# Patient Record
Sex: FEMALE | Race: WHITE | NOT HISPANIC OR LATINO | Employment: OTHER | URBAN - METROPOLITAN AREA
[De-identification: names, ages, dates, MRNs, and addresses within clinical notes are randomized per-mention and may not be internally consistent; named-entity substitution may affect disease eponyms.]

---

## 2017-01-19 ENCOUNTER — HOSPITAL ENCOUNTER (OUTPATIENT)
Dept: GENERAL RADIOLOGY | Facility: HOSPITAL | Age: 62
Discharge: HOME | End: 2017-01-19
Attending: PODIATRIST | Admitting: PODIATRIST

## 2017-01-19 DIAGNOSIS — M20.12 HALLUX VALGUS, LEFT: Primary | ICD-10-CM

## 2017-01-19 PROCEDURE — 73630 X-RAY EXAM OF FOOT: CPT

## 2017-03-13 ENCOUNTER — OFFICE (OUTPATIENT)
Dept: URBAN - METROPOLITAN AREA CLINIC 71 | Facility: CLINIC | Age: 62
End: 2017-03-13

## 2017-03-13 VITALS — DIASTOLIC BLOOD PRESSURE: 90 MMHG | HEART RATE: 64 BPM | SYSTOLIC BLOOD PRESSURE: 138 MMHG | HEIGHT: 64 IN

## 2017-03-13 DIAGNOSIS — Z12.11 ENCOUNTER FOR SCREENING FOR MALIGNANT NEOPLASM OF COLON: ICD-10-CM

## 2017-03-13 DIAGNOSIS — K21.0 GASTRO-ESOPHAGEAL REFLUX DISEASE WITH ESOPHAGITIS: ICD-10-CM

## 2017-03-13 PROCEDURE — 99212 OFFICE O/P EST SF 10 MIN: CPT

## 2017-03-27 RX ORDER — BUDESONIDE AND FORMOTEROL FUMARATE DIHYDRATE 160; 4.5 UG/1; UG/1
AEROSOL RESPIRATORY (INHALATION)
Qty: 30.6 G | Refills: 2 | Status: SHIPPED | OUTPATIENT
Start: 2017-03-27 | End: 2019-07-12

## 2017-05-30 RX ORDER — LISINOPRIL 5 MG/1
TABLET ORAL
Qty: 90 TABLET | Refills: 0 | Status: SHIPPED | OUTPATIENT
Start: 2017-05-30 | End: 2017-08-28 | Stop reason: SDUPTHER

## 2017-06-05 ENCOUNTER — TRANSCRIBE ORDERS (OUTPATIENT)
Dept: ADMINISTRATIVE | Facility: HOSPITAL | Age: 62
End: 2017-06-05

## 2017-06-06 ENCOUNTER — TELEPHONE (OUTPATIENT)
Dept: INTERNAL MEDICINE | Facility: CLINIC | Age: 62
End: 2017-06-06

## 2017-06-06 DIAGNOSIS — N64.89 BREAST ASYMMETRY IN FEMALE: ICD-10-CM

## 2017-06-06 DIAGNOSIS — N64.4 BREAST PAIN, LEFT: Primary | ICD-10-CM

## 2017-06-06 NOTE — TELEPHONE ENCOUNTER
I put in order for DX mammo with ultrasound, if needed.  Patient advised.    ----- Message from Amber Duncan MA sent at 6/5/2017  2:59 PM EDT -----      ----- Message -----     From: Kae Méndez     Sent: 6/5/2017   1:10 PM       [facility] [facility]    PT IS CALLING TO SAY SHE NEEDS AN ORDER PUT IN FOR A MAMMOGRAM. STATES SHE CALLED [facility] TO SCHEDULE IT BUT EXPLAINED TO THEM THAT HER LEFT BREAST IS QUITE A BIT BIGGER THAN HER RIGHT AND WHEN SHE PUSHES AGAINST IT, ITS PAINFUL. PT SEES [facility]. CALL BACK [phone].

## 2017-06-12 ENCOUNTER — APPOINTMENT (OUTPATIENT)
Dept: MAMMOGRAPHY | Facility: HOSPITAL | Age: 62
End: 2017-06-12
Attending: FAMILY MEDICINE

## 2017-06-12 ENCOUNTER — APPOINTMENT (OUTPATIENT)
Dept: ULTRASOUND IMAGING | Facility: HOSPITAL | Age: 62
End: 2017-06-12
Attending: FAMILY MEDICINE

## 2017-06-16 ENCOUNTER — HOSPITAL ENCOUNTER (OUTPATIENT)
Dept: MAMMOGRAPHY | Facility: HOSPITAL | Age: 62
Discharge: HOME | End: 2017-06-16
Attending: FAMILY MEDICINE | Admitting: FAMILY MEDICINE

## 2017-06-16 ENCOUNTER — HOSPITAL ENCOUNTER (OUTPATIENT)
Dept: ULTRASOUND IMAGING | Facility: HOSPITAL | Age: 62
Discharge: HOME | End: 2017-06-16
Attending: FAMILY MEDICINE

## 2017-06-16 DIAGNOSIS — N64.89 BREAST ASYMMETRY IN FEMALE: ICD-10-CM

## 2017-06-16 DIAGNOSIS — N64.4 BREAST PAIN, LEFT: ICD-10-CM

## 2017-06-16 PROCEDURE — G0279 TOMOSYNTHESIS, MAMMO: HCPCS

## 2017-06-16 PROCEDURE — 76642 ULTRASOUND BREAST LIMITED: CPT

## 2017-06-16 PROCEDURE — G0204 DX MAMMO INCL CAD BI: HCPCS

## 2017-08-15 ENCOUNTER — ANESTHESIA EVENT (OUTPATIENT)
Dept: PERIOP | Facility: HOSPITAL | Age: 62
End: 2017-08-15

## 2017-08-16 ENCOUNTER — HOSPITAL ENCOUNTER (OUTPATIENT)
Facility: HOSPITAL | Age: 62
Setting detail: HOSPITAL OUTPATIENT SURGERY
Discharge: HOME | End: 2017-08-16
Attending: INTERNAL MEDICINE | Admitting: INTERNAL MEDICINE

## 2017-08-16 ENCOUNTER — ANESTHESIA (OUTPATIENT)
Dept: PERIOP | Facility: HOSPITAL | Age: 62
End: 2017-08-16

## 2017-08-16 ENCOUNTER — PREP FOR SURGERY (OUTPATIENT)
Dept: OTHER | Facility: HOSPITAL | Age: 62
End: 2017-08-16

## 2017-08-16 ENCOUNTER — ON CAMPUS - OUTPATIENT (OUTPATIENT)
Dept: URBAN - METROPOLITAN AREA HOSPITAL 28 | Facility: HOSPITAL | Age: 62
End: 2017-08-16
Payer: COMMERCIAL

## 2017-08-16 VITALS
TEMPERATURE: 98.3 F | WEIGHT: 121.4 LBS | HEART RATE: 80 BPM | DIASTOLIC BLOOD PRESSURE: 79 MMHG | OXYGEN SATURATION: 97 % | SYSTOLIC BLOOD PRESSURE: 159 MMHG | RESPIRATION RATE: 16 BRPM | BODY MASS INDEX: 23.84 KG/M2 | HEIGHT: 60 IN

## 2017-08-16 DIAGNOSIS — K22.70 BARRETT'S ESOPHAGUS: ICD-10-CM

## 2017-08-16 DIAGNOSIS — K21.9 GASTRO-ESOPHAGEAL REFLUX DISEASE WITHOUT ESOPHAGITIS: ICD-10-CM

## 2017-08-16 DIAGNOSIS — B37.81 CANDIDAL ESOPHAGITIS: ICD-10-CM

## 2017-08-16 DIAGNOSIS — K20.9 ESOPHAGITIS, UNSPECIFIED: ICD-10-CM

## 2017-08-16 DIAGNOSIS — K22.70 BARRETT ESOPHAGUS: ICD-10-CM

## 2017-08-16 DIAGNOSIS — K31.89 OTHER DISEASES OF STOMACH AND DUODENUM: ICD-10-CM

## 2017-08-16 DIAGNOSIS — K22.70 BARRETT'S ESOPHAGUS: Primary | ICD-10-CM

## 2017-08-16 PROCEDURE — 25010000002 PROPOFOL 10 MG/ML EMULSION: Performed by: NURSE ANESTHETIST, CERTIFIED REGISTERED

## 2017-08-16 PROCEDURE — 43239 EGD BIOPSY SINGLE/MULTIPLE: CPT

## 2017-08-16 RX ORDER — SODIUM CHLORIDE 0.9 % (FLUSH) 0.9 %
1-10 SYRINGE (ML) INJECTION AS NEEDED
Status: DISCONTINUED | OUTPATIENT
Start: 2017-08-16 | End: 2017-08-16 | Stop reason: HOSPADM

## 2017-08-16 RX ORDER — GLYCOPYRROLATE 0.2 MG/ML
INJECTION INTRAMUSCULAR; INTRAVENOUS AS NEEDED
Status: DISCONTINUED | OUTPATIENT
Start: 2017-08-16 | End: 2017-08-16 | Stop reason: SURG

## 2017-08-16 RX ORDER — LIDOCAINE HYDROCHLORIDE 10 MG/ML
0.5 INJECTION, SOLUTION EPIDURAL; INFILTRATION; INTRACAUDAL; PERINEURAL ONCE AS NEEDED
Status: COMPLETED | OUTPATIENT
Start: 2017-08-16 | End: 2017-08-16

## 2017-08-16 RX ORDER — FLUCONAZOLE 100 MG/1
100 TABLET ORAL DAILY
Qty: 7 TABLET | Refills: 1 | Status: SHIPPED | OUTPATIENT
Start: 2017-08-16 | End: 2017-08-23

## 2017-08-16 RX ORDER — PROPOFOL 10 MG/ML
VIAL (ML) INTRAVENOUS AS NEEDED
Status: DISCONTINUED | OUTPATIENT
Start: 2017-08-16 | End: 2017-08-16 | Stop reason: SURG

## 2017-08-16 RX ORDER — PROPOFOL 10 MG/ML
VIAL (ML) INTRAVENOUS CONTINUOUS PRN
Status: DISCONTINUED | OUTPATIENT
Start: 2017-08-16 | End: 2017-08-16 | Stop reason: SURG

## 2017-08-16 RX ORDER — SODIUM CHLORIDE 0.9 % (FLUSH) 0.9 %
1-10 SYRINGE (ML) INJECTION AS NEEDED
Status: CANCELLED | OUTPATIENT
Start: 2017-08-16

## 2017-08-16 RX ORDER — LIDOCAINE HYDROCHLORIDE 20 MG/ML
INJECTION, SOLUTION INFILTRATION; PERINEURAL AS NEEDED
Status: DISCONTINUED | OUTPATIENT
Start: 2017-08-16 | End: 2017-08-16 | Stop reason: SURG

## 2017-08-16 RX ORDER — SODIUM CHLORIDE, SODIUM LACTATE, POTASSIUM CHLORIDE, CALCIUM CHLORIDE 600; 310; 30; 20 MG/100ML; MG/100ML; MG/100ML; MG/100ML
9 INJECTION, SOLUTION INTRAVENOUS CONTINUOUS
Status: DISCONTINUED | OUTPATIENT
Start: 2017-08-16 | End: 2017-08-16 | Stop reason: HOSPADM

## 2017-08-16 RX ADMIN — PROPOFOL 50 MG: 10 INJECTION, EMULSION INTRAVENOUS at 13:22

## 2017-08-16 RX ADMIN — GLYCOPYRROLATE 0.1 MG: 0.2 INJECTION INTRAMUSCULAR; INTRAVENOUS at 13:24

## 2017-08-16 RX ADMIN — LIDOCAINE HYDROCHLORIDE 50 MG: 20 INJECTION, SOLUTION INFILTRATION; PERINEURAL at 13:24

## 2017-08-16 RX ADMIN — SODIUM CHLORIDE, POTASSIUM CHLORIDE, SODIUM LACTATE AND CALCIUM CHLORIDE: 600; 310; 30; 20 INJECTION, SOLUTION INTRAVENOUS at 13:20

## 2017-08-16 RX ADMIN — PROPOFOL 50 MCG/KG/MIN: 10 INJECTION, EMULSION INTRAVENOUS at 13:22

## 2017-08-16 RX ADMIN — PROPOFOL 40 MG: 10 INJECTION, EMULSION INTRAVENOUS at 13:25

## 2017-08-16 RX ADMIN — SODIUM CHLORIDE, POTASSIUM CHLORIDE, SODIUM LACTATE AND CALCIUM CHLORIDE 9 ML/HR: 600; 310; 30; 20 INJECTION, SOLUTION INTRAVENOUS at 12:20

## 2017-08-16 RX ADMIN — PROPOFOL 50 MG: 10 INJECTION, EMULSION INTRAVENOUS at 13:28

## 2017-08-16 RX ADMIN — LIDOCAINE HYDROCHLORIDE 0.2 ML: 10 INJECTION, SOLUTION EPIDURAL; INFILTRATION; INTRACAUDAL; PERINEURAL at 12:20

## 2017-08-16 NOTE — BRIEF OP NOTE
ESOPHAGOGASTRODUODENOSCOPY  Procedure Note    Milan Hinds  8/16/2017    Pre-op Diagnosis:   Z21.0    Post-op Diagnosis:     Post-Op Diagnosis Codes:     * Gastric nodule [K31.89]     * Short-segment Olivier's esophagus [K22.70]     * Esophageal candidiasis [B37.81]    Procedure/CPT® Codes:      Procedure(s):  ESOPHAGOGASTRODUODENOSCOPY w/ biopsies    Surgeon(s):  Garth Morales MD    Anesthesia: [facility] Anesthesia Care    Staff:   Circulator: Estrella Salazar RN  Scrub Person: DONNY FLORES    Estimated Blood Loss: *No blood loss documented*  Urine Voided: * No values recorded between 8/16/2017  1:18 PM and 8/16/2017  1:35 PM *    Specimens:                  ID Type Source Tests Collected by Time Destination   A : distal eosphagus biopsy Tissue Esophagus, Distal TISSUE EXAM Garth Morales MD 8/16/2017 1327          Drains:           Findings: Normal Duodenum  Antral submucosal nodule  Short segment Barretts Esophagus-Biopsy  Esophageal candidiasis    Complications: none    Recommendations:  Rx Fluconazole 100mg x 7 days  Results to be called   Will arrange an OP EUS/FNA for her Gastric submucosal nodule      Garth Morales MD     Date: 8/16/2017  Time: 1:35 PM

## 2017-08-16 NOTE — PLAN OF CARE
Problem: GI Endoscopy (Adult)  Goal: Signs and Symptoms of Listed Potential Problems Will be Absent or Manageable (GI Endoscopy)  Outcome: Ongoing (interventions implemented as appropriate)    08/16/17 1321   GI Endoscopy   Problems Assessed (GI Endoscopy) all   Problems Present (GI Endoscopy) none

## 2017-08-16 NOTE — PLAN OF CARE
Problem: Patient Care Overview (Adult)  Goal: Plan of Care Review  Outcome: Ongoing (interventions implemented as appropriate)    08/16/17 1231   Coping/Psychosocial Response Interventions   Plan Of Care Reviewed With patient   Outcome Evaluation   Outcome Summary/Follow up Plan vss. no c/o. ready for procedure.       Goal: Adult Individualization and Mutuality  Outcome: Ongoing (interventions implemented as appropriate)    Problem: GI Endoscopy (Adult)  Goal: Signs and Symptoms of Listed Potential Problems Will be Absent or Manageable (GI Endoscopy)  Outcome: Ongoing (interventions implemented as appropriate)

## 2017-08-16 NOTE — ANESTHESIA PREPROCEDURE EVALUATION
Anesthesia Evaluation     Patient summary reviewed   NPO Solid Status: > 8 hours  NPO Liquid Status: > 8 hours     Airway   Mallampati: II  TM distance: >3 FB  Neck ROM: full  no difficulty expected  Dental - normal exam     Pulmonary - normal exam    breath sounds clear to auscultation  (+) a smoker (50 pck) Current, COPD (takes symbicort at night, chronic cough) mild,   Cardiovascular - normal exam  Exercise tolerance: good (4-7 METS)    Rhythm: regular  Rate: normal    (+) hypertension well controlled,       Neuro/Psych- negative ROS  GI/Hepatic/Renal/Endo    (+)  GERD well controlled,     Musculoskeletal     (+) back pain (took percocet this AM),   Abdominal  - normal exam   Substance History - negative use     OB/GYN negative ob/gyn ROS         Other                                        Anesthesia Plan    ASA 3     MAC     intravenous induction   Anesthetic plan and risks discussed with patient.

## 2017-08-16 NOTE — ANESTHESIA POSTPROCEDURE EVALUATION
Patient: Milan Hinds    Procedure Summary     Date Anesthesia Start Anesthesia Stop Room / Location    08/16/17 1320 1335 [facility] / [facility]       Procedure Diagnosis Surgeon Provider    ESOPHAGOGASTRODUODENOSCOPY w/ biopsies (N/A Esophagus) Gastric nodule; Short-segment Olivier's esophagus; Esophageal candidiasis  (Z21.0) MD Sanya Arizmendi CRNA          Anesthesia Type: MAC  Last vitals  BP        Temp        Pulse       Resp        SpO2          Post Anesthesia Care and Evaluation    Patient location during evaluation: PHASE II  Patient participation: complete - patient participated  Level of consciousness: awake and alert  Pain score: 0  Pain management: adequate  Airway patency: patent  Anesthetic complications: No anesthetic complications  PONV Status: none  Cardiovascular status: acceptable  Respiratory status: acceptable  Hydration status: acceptable    Comments: 137/82  RR 12   HR 64   SpO2 95%

## 2017-08-16 NOTE — OP NOTE
Op Note  Date of Service: 8/16/2017 1:35 PM  Garth Morales MD   Gastroenterology        ESOPHAGOGASTRODUODENOSCOPY  Procedure Note     Milan Hinds  8/16/2017     Pre-op Diagnosis:   Z21.0     Post-op Diagnosis:     Post-Op Diagnosis Codes:     * Gastric nodule [K31.89]     * Short-segment Olivier's esophagus [K22.70]     * Esophageal candidiasis [B37.81]     Procedure/CPT® Codes:  Indication:   Barretts Esophagus      Procedure(s):  ESOPHAGOGASTRODUODENOSCOPY w/ biopsies     Surgeon(s):  Garth Morales MD     Anesthesia: Monitor Anesthesia Care     Staff:   Circulator: Estrella Salazar RN  Scrub Person: DONNY FLORES     Estimated Blood Loss: None  Urine Voided: * No values recorded between 8/16/2017  1:18 PM and 8/16/2017  1:35 PM *     Specimens:                   ID Type Source Tests Collected by Time Destination   A : distal eosphagus biopsy Tissue Esophagus, Distal TISSUE EXAM Garth Morales MD 8/16/2017 1327            Procedure:  After having signed informed consent, she was brought to the endoscopy suite and placed in the left lateral decubitus position and given her IV sedation. A bite block was placed between her incisors. Scope was introduced into the oropharynx, advanced under direct visualization into the esophagus, through the distal esophagus. There was evidence of diffuse, but mild esophageal candidiasis. There is also evidence of an irregular Z-line consistent with short segment Olivier's esophagus. There was no stricture. No active ulcers. Scope was advanced to the stomach and retroflexed showing otherwise normal cardia and fundus. As the scope was advanced to the antrum there was a submucosal nodule that did not appear to be consistent with a lipoma. The scope was advanced passed this through the pylorus, through the duodenal bulb, which was examined normal and to the second and third portion of the duodenum, which was normal as well. The  scope was withdrawn back into the antrum. No biopsies were taken of the submucosal nodule. Scope was withdrawn to the distal esophagus where biopsies were taken in a targeted fashion and sent as distal esophageal biopsies. The scope was withdrawn. The candidiasis was again note, but no biopsies were taken. The scope was taken from the patient. She tolerated the procedure well.          Findings: Normal Duodenum  Antral submucosal nodule  Short segment Barretts Esophagus-Biopsy  Esophageal candidiasis     Complications: none     Recommendations:  Rx Fluconazole 100mg x 7 days  Results to be called   Will arrange an OP EUS/FNA for her Gastric submucosal nodule        Garth Morales MD      Date: 8/16/2017  Time: 1:35 PM

## 2017-08-16 NOTE — H&P
Patient Care Team:  Des Weir MD as PCP - General    CHIEF COMPLAINT: Barretts esophagus    HISTORY OF PRESENT ILLNESS:    Last exam  no dyspohagia nor weight loss      Past Medical History:   Diagnosis Date   • Anxiety    • Arthritis    • Back pain    • Carotid artery stenosis    • Cataract    • Chronic pain     has had epidural injections, goes to [facility]   • COPD (chronic obstructive pulmonary disease)    • Diverticular disease    • Dysphagia     history of   • Failed back syndrome, lumbar     failed fusion   • Frequent falls     history of, none recently   • GERD (gastroesophageal reflux disease)    • History of transfusion    • Hyperlipidemia    • Hypertension    • IBS (irritable bowel syndrome)     history of   • Murmur    • Sciatic pain     bilateral   • Vitamin D deficiency      Past Surgical History:   Procedure Laterality Date   • BACK SURGERY      lumbar fusion   • BLADDER SURGERY      bladder lift   • BUNIONECTOMY Left 2016    Procedure: LAPIDUS BUNIONECTOMY LEFT FOOT;  Surgeon: Raphael Storm DPM;  Location: [facility];  Service:    •  SECTION      x2   • HYSTERECTOMY     • INSERTION / REMOVAL EPIDURAL SPINAL NEUROSTIMULATOR     • NECK SURGERY      C6C7 fusion   • REPLACEMENT TOTAL KNEE Left      Family History   Problem Relation Age of Onset   • Pancreatic cancer Sister      Social History   Substance Use Topics   • Smoking status: Current Every Day Smoker     Packs/day: 1.00     Years: 45.00     Types: Cigarettes   • Smokeless tobacco: Never Used   • Alcohol use No     Prescriptions Prior to Admission   Medication Sig Dispense Refill Last Dose   • baclofen (LIORESAL) 10 MG tablet Take 10 mg by mouth 3 (Three) Times a Day.   2017 at 1030   • doxycycline (VIBRAMYCIN) 50 MG capsule Take 50 mg by mouth Daily.   2017 at 1030   • lisinopril (PRINIVIL,ZESTRIL) 5 MG tablet TAKE 1 TABLET DAILY 90 tablet 0 2017 at 0630   • omeprazole (priLOSEC) 20 MG  "capsule Take 20 mg by mouth 3 (Three) Times a Day.   8/16/2017 at 0630   • oxyCODONE-acetaminophen (PERCOCET) 5-325 MG per tablet Take 1-2 tablets by mouth Every 4 (Four) Hours As Needed for severe pain (7-10) (Pain). 60 tablet 0 8/16/2017 at 1030   • pregabalin (LYRICA) 75 MG capsule Take 75 mg by mouth 2 (two) times a day.   8/16/2017 at 0630   • promethazine (PHENERGAN) 12.5 MG tablet Take 12.5 mg by mouth Every 6 (Six) Hours As Needed for nausea.   Past Week at Unknown time   • SYMBICORT 160-4.5 MCG/ACT inhaler USE 2 INHALATIONS TWICE A DAY (RINSE MOUTH AFTER USE) 30.6 g 2 Past Week at Unknown time   • morphine (MS CONTIN) 15 MG 12 hr tablet Take 15 mg by mouth 3 (Three) Times a Day.   More than a month at Unknown time     Allergies:  No known drug allergy    REVIEW OF SYSTEMS:  Please see the above history of present illness for pertinent positives and negatives.  The remainder of the patient's systems have been reviewed and are negative.     Vital Signs  Temp:  [98.1 °F (36.7 °C)] 98.1 °F (36.7 °C)  Heart Rate:  [66] 66  Resp:  [18] 18  BP: (161)/(78) 161/78    Flowsheet Rows         First Filed Value    Admission Height  60\" (152.4 cm) Documented at 08/16/2017 1214    Admission Weight  121 lb 6.4 oz (55.1 kg) Documented at 08/16/2017 1214           Physical Exam:  Physical Exam   Constitutional: Patient appears well-developed and well-nourished and in no acute distress   HEENT:   Head: Normocephalic and atraumatic.   Eyes:  Pupils are equal, round, and reactive to light. EOM are intact. Sclera are anicteric and non-injected.  Mouth and Throat: Patient has moist mucous membranes. Oropharynx is clear of any erythema or exudate.     Neck: Neck supple. No JVD present. No thyromegaly present. No lymphadenopathy present.  Cardiovascular: Regular rate, regular rhythm, S1 normal and S2 normal.  Exam reveals no gallop and no friction rub.  No murmur heard.  Pulmonary/Chest: Lungs are clear to auscultation bilaterally. " No respiratory distress. No wheezes. No rhonchi. No rales.   Abdominal: Soft. Bowel sounds are normal. No distension and no mass. There is no hepatosplenomegaly. There is no tenderness.   Musculoskeletal: Normal Muscle tone  Extremities: No edema. Pulses are palpable in all 4 extremities.  Neurological: Patient is alert and oriented to person, place, and time. Cranial nerves II-XII are grossly intact with no focal deficits.  Skin: Skin is warm. No rash noted. Nails show no clubbing.  No cyanosis or erythema.     Results Review:    I reviewed the patient's new clinical results.  Lab Results (most recent)     None          Imaging Results (most recent)     None        reviewed    ECG/EMG Results (most recent)     None        reviewed    Assessment/Plan     Barretts Esophagus    EGD w Biopsy    I discussed the patients findings and my recommendations with patient .     Garth Morales MD  08/16/17  1:04 PM    Time: 10 min prior to procedure.

## 2017-08-18 LAB
LAB AP CASE REPORT: NORMAL
Lab: NORMAL
PATH REPORT.FINAL DX SPEC: NORMAL

## 2017-08-25 ENCOUNTER — OFFICE VISIT (OUTPATIENT)
Dept: GASTROENTEROLOGY | Facility: CLINIC | Age: 62
End: 2017-08-25

## 2017-08-25 VITALS
WEIGHT: 122 LBS | TEMPERATURE: 98.1 F | SYSTOLIC BLOOD PRESSURE: 160 MMHG | DIASTOLIC BLOOD PRESSURE: 96 MMHG | BODY MASS INDEX: 23.95 KG/M2 | HEIGHT: 60 IN

## 2017-08-25 DIAGNOSIS — K31.89 GASTRIC NODULE: Primary | ICD-10-CM

## 2017-08-25 PROCEDURE — 99203 OFFICE O/P NEW LOW 30 MIN: CPT | Performed by: INTERNAL MEDICINE

## 2017-08-25 RX ORDER — SODIUM CHLORIDE 0.9 % (FLUSH) 0.9 %
1-10 SYRINGE (ML) INJECTION AS NEEDED
Status: CANCELLED | OUTPATIENT
Start: 2017-09-27

## 2017-08-25 RX ORDER — DIAZEPAM 5 MG/1
5 TABLET ORAL
COMMUNITY
Start: 2017-08-11 | End: 2018-09-02

## 2017-08-25 NOTE — PROGRESS NOTES
Chief Complaint   Patient presents with   • needs EUS     Subjective      HPI     Milan Hinds is a 62 y.o. female who presents for evaluation of gastric submucosal nodule.  This was noted on recent EGD done for indication of surveillance for Barretts esophagus.  Pt tells me this nodule has been present for at least 6 or 7 years.  She tells me she actually underwent EUS with Dr. Peña Kirkpatrick around the time of initial diagnosis.  She was told it was most likely benign and would be monitored.  She has family history of pancreatic cancer and tells me she had CT scan performed about 2-3 years ago which she recalls as being normal.  I cannot see any record of this scan in our system.  She denies weight loss.      Past Medical History:   Diagnosis Date   • Anxiety    • Arthritis    • Back pain    • Carotid artery stenosis    • Cataract    • Chronic pain     has had epidural injections, goes to [facility]   • COPD (chronic obstructive pulmonary disease)    • Diverticular disease    • Dysphagia     history of   • Failed back syndrome, lumbar     failed fusion   • Frequent falls     history of, none recently   • GERD (gastroesophageal reflux disease)    • History of transfusion 1978   • Hyperlipidemia    • Hypertension    • IBS (irritable bowel syndrome)     history of   • Murmur    • Sciatic pain     bilateral   • Vitamin D deficiency        Current Outpatient Prescriptions:   •  baclofen (LIORESAL) 10 MG tablet, Take 10 mg by mouth 3 (Three) Times a Day., Disp: , Rfl:   •  diazePAM (VALIUM) 5 MG tablet, Take 5 mg by mouth., Disp: , Rfl:   •  doxycycline (VIBRAMYCIN) 50 MG capsule, Take 50 mg by mouth Daily., Disp: , Rfl:   •  lisinopril (PRINIVIL,ZESTRIL) 5 MG tablet, TAKE 1 TABLET DAILY, Disp: 90 tablet, Rfl: 0  •  omeprazole (priLOSEC) 20 MG capsule, Take 20 mg by mouth 3 (Three) Times a Day., Disp: , Rfl:   •  oxyCODONE-acetaminophen (PERCOCET) 5-325 MG per tablet, Take 1-2 tablets by mouth Every 4 (Four) Hours  As Needed for severe pain (7-10) (Pain)., Disp: 60 tablet, Rfl: 0  •  pregabalin (LYRICA) 75 MG capsule, Take 75 mg by mouth 2 (two) times a day., Disp: , Rfl:   •  promethazine (PHENERGAN) 12.5 MG tablet, Take 12.5 mg by mouth Every 6 (Six) Hours As Needed for nausea., Disp: , Rfl:   •  SYMBICORT 160-4.5 MCG/ACT inhaler, USE 2 INHALATIONS TWICE A DAY (RINSE MOUTH AFTER USE), Disp: 30.6 g, Rfl: 2     Allergies   Allergen Reactions   • No Known Drug Allergy      Social History     Social History   • Marital status:      Spouse name: N/A   • Number of children: N/A   • Years of education: Some college     Occupational History   •       Social History Main Topics   • Smoking status: Current Every Day Smoker     Packs/day: 1.00     Years: 45.00     Types: Cigarettes   • Smokeless tobacco: Never Used   • Alcohol use No   • Drug use: No   • Sexual activity: Defer     Other Topics Concern   • Not on file     Social History Narrative     Family History   Problem Relation Age of Onset   • Pancreatic cancer Sister    • Pancreatic cancer Sister      Review of Systems   Constitutional: Negative.    Respiratory: Negative.    Cardiovascular: Negative.    Gastrointestinal: Negative.    Psychiatric/Behavioral: Negative.      Objective   Vitals:    08/25/17 1045   BP: 160/96   Temp: 98.1 °F (36.7 °C)     Physical Exam   Constitutional: She is oriented to person, place, and time. She appears well-developed and well-nourished.   HENT:   Head: Normocephalic and atraumatic.   Abdominal: Soft. Bowel sounds are normal. She exhibits no distension and no mass. There is no tenderness. No hernia.   Neurological: She is alert and oriented to person, place, and time.   Skin: Skin is warm and dry.   Psychiatric: She has a normal mood and affect. Her behavior is normal. Judgment and thought content normal.   Vitals reviewed.    Assessment/Plan   Assessment:     1. Gastric nodule    2.      Family hx of pancreatic  cancer    Plan:   Pt with long standing gastric antral nodule, no obvious change in size or appearance from what I can tell from review of available records.  This is certainly is reassuring and would suggest benign etiology, with differential including lieomyoma, indolent GIST, or lipoma.  I have requested records from [facility] and [facility] regarding prior EUS and CT scans.  We will plan for EUS in attempt to obtain tissue diagnosis to further direct management going forward.        Jimbo Medina M.D.  [facility]  [address] [place] [address]  Office: [phone]

## 2017-08-28 RX ORDER — LISINOPRIL 5 MG/1
TABLET ORAL
Qty: 90 TABLET | Refills: 1 | Status: SHIPPED | OUTPATIENT
Start: 2017-08-28 | End: 2017-09-14 | Stop reason: SDUPTHER

## 2017-09-07 ENCOUNTER — APPOINTMENT (OUTPATIENT)
Dept: CT IMAGING | Facility: HOSPITAL | Age: 62
End: 2017-09-07

## 2017-09-14 ENCOUNTER — OFFICE VISIT (OUTPATIENT)
Dept: INTERNAL MEDICINE | Facility: CLINIC | Age: 62
End: 2017-09-14

## 2017-09-14 VITALS
SYSTOLIC BLOOD PRESSURE: 164 MMHG | OXYGEN SATURATION: 96 % | TEMPERATURE: 98.2 F | BODY MASS INDEX: 23.64 KG/M2 | DIASTOLIC BLOOD PRESSURE: 92 MMHG | HEIGHT: 60 IN | WEIGHT: 120.4 LBS | HEART RATE: 91 BPM

## 2017-09-14 DIAGNOSIS — K21.9 GASTROESOPHAGEAL REFLUX DISEASE WITHOUT ESOPHAGITIS: ICD-10-CM

## 2017-09-14 DIAGNOSIS — M54.2 NECK PAIN: ICD-10-CM

## 2017-09-14 DIAGNOSIS — I10 ESSENTIAL HYPERTENSION: Primary | ICD-10-CM

## 2017-09-14 DIAGNOSIS — F17.200 SMOKER: ICD-10-CM

## 2017-09-14 DIAGNOSIS — F41.9 ANXIETY: ICD-10-CM

## 2017-09-14 DIAGNOSIS — J44.9 CHRONIC OBSTRUCTIVE PULMONARY DISEASE, UNSPECIFIED COPD TYPE (HCC): ICD-10-CM

## 2017-09-14 LAB
ALBUMIN SERPL-MCNC: 4 G/DL (ref 3.5–5.2)
ALBUMIN/GLOB SERPL: 1.7 G/DL
ALP SERPL-CCNC: 90 U/L (ref 40–129)
ALT SERPL-CCNC: 14 U/L (ref 5–33)
AST SERPL-CCNC: 22 U/L (ref 5–32)
BASOPHILS # BLD AUTO: 0.05 10*3/MM3 (ref 0–0.2)
BASOPHILS NFR BLD AUTO: 0.7 % (ref 0–2)
BILIRUB SERPL-MCNC: 0.2 MG/DL (ref 0.2–1.2)
BUN SERPL-MCNC: 6 MG/DL (ref 8–23)
BUN/CREAT SERPL: 8.2 (ref 7–25)
CALCIUM SERPL-MCNC: 9.3 MG/DL (ref 8.8–10.5)
CHLORIDE SERPL-SCNC: 105 MMOL/L (ref 98–107)
CHOLEST SERPL-MCNC: 223 MG/DL (ref 0–200)
CHOLEST/HDLC SERPL: 5.07 {RATIO}
CO2 SERPL-SCNC: 28.4 MMOL/L (ref 22–29)
CREAT SERPL-MCNC: 0.73 MG/DL (ref 0.57–1)
EOSINOPHIL # BLD AUTO: 0.05 10*3/MM3 (ref 0.1–0.3)
EOSINOPHIL NFR BLD AUTO: 0.7 % (ref 0–4)
ERYTHROCYTE [DISTWIDTH] IN BLOOD BY AUTOMATED COUNT: 13.6 % (ref 11.5–14.5)
GLOBULIN SER CALC-MCNC: 2.4 GM/DL
GLUCOSE SERPL-MCNC: 85 MG/DL (ref 65–99)
HCT VFR BLD AUTO: 43.7 % (ref 37–47)
HDLC SERPL-MCNC: 44 MG/DL (ref 40–60)
HGB BLD-MCNC: 14.2 G/DL (ref 12–16)
IMM GRANULOCYTES # BLD: 0.01 10*3/MM3 (ref 0–0.03)
IMM GRANULOCYTES NFR BLD: 0.1 % (ref 0–0.5)
LDLC SERPL CALC-MCNC: 129 MG/DL (ref 0–100)
LYMPHOCYTES # BLD AUTO: 3.28 10*3/MM3 (ref 0.6–4.8)
LYMPHOCYTES NFR BLD AUTO: 43.9 % (ref 20–45)
MCH RBC QN AUTO: 29.6 PG (ref 27–31)
MCHC RBC AUTO-ENTMCNC: 32.5 G/DL (ref 31–37)
MCV RBC AUTO: 91.2 FL (ref 81–99)
MONOCYTES # BLD AUTO: 0.42 10*3/MM3 (ref 0–1)
MONOCYTES NFR BLD AUTO: 5.6 % (ref 3–8)
NEUTROPHILS # BLD AUTO: 3.67 10*3/MM3 (ref 1.5–8.3)
NEUTROPHILS NFR BLD AUTO: 49 % (ref 45–70)
NRBC BLD AUTO-RTO: 0 /100 WBC (ref 0–0)
PLATELET # BLD AUTO: 277 10*3/MM3 (ref 140–500)
POTASSIUM SERPL-SCNC: 4.8 MMOL/L (ref 3.5–5.2)
PROT SERPL-MCNC: 6.4 G/DL (ref 6–8.5)
RBC # BLD AUTO: 4.79 10*6/MM3 (ref 4.2–5.4)
SODIUM SERPL-SCNC: 143 MMOL/L (ref 136–145)
TRIGL SERPL-MCNC: 249 MG/DL (ref 0–150)
TSH SERPL DL<=0.005 MIU/L-ACNC: 0.41 MIU/ML (ref 0.27–4.2)
VLDLC SERPL CALC-MCNC: 49.8 MG/DL (ref 7–27)
WBC # BLD AUTO: 7.48 10*3/MM3 (ref 4.8–10.8)

## 2017-09-14 PROCEDURE — 99214 OFFICE O/P EST MOD 30 MIN: CPT | Performed by: FAMILY MEDICINE

## 2017-09-14 RX ORDER — METHADONE HYDROCHLORIDE 5 MG/1
5 TABLET ORAL EVERY 6 HOURS PRN
COMMUNITY
End: 2018-01-11

## 2017-09-14 RX ORDER — LISINOPRIL 10 MG/1
10 TABLET ORAL DAILY
Qty: 90 TABLET | Refills: 1 | Status: SHIPPED | OUTPATIENT
Start: 2017-09-14 | End: 2018-02-23 | Stop reason: SDUPTHER

## 2017-09-14 NOTE — PROGRESS NOTES
Subjective     Milan Hinds is a 62 y.o. female, who presents with a chief complaint of   Chief Complaint   Patient presents with   • Hypertension       Hypertension   Associated symptoms include neck pain.        1. HTN.  Tolerating medication.  Denies chest pain, dizziness, chest pain, headaches.  She was told that her BP was elevated X 2 at other visits, I.e. When she had her endoscopy, 180s-190s systolic.      2. COPD.  Pt denies wheezing, shortness of breath, cough.    3. Chronic neck pain.  She is followed by pain management.  Symptoms are stable.    4. Smoking.  She has cut down to 1/2 PPD.      The following portions of the patient's history were reviewed and updated as appropriate: allergies, current medications, past family history, past medical history, past social history, past surgical history and problem list.    Allergies: No known drug allergy    Review of Systems   Constitutional: Negative.    HENT: Negative.    Eyes: Negative.    Respiratory: Negative.    Cardiovascular: Negative.    Gastrointestinal: Negative.    Endocrine: Negative.    Genitourinary: Negative.    Musculoskeletal: Positive for back pain and neck pain.   Skin: Negative.    Allergic/Immunologic: Positive for environmental allergies.   Neurological: Negative.    Hematological: Negative.    Psychiatric/Behavioral: The patient is nervous/anxious.        Objective     Wt Readings from Last 3 Encounters:   09/14/17 120 lb 6.4 oz (54.6 kg)   08/25/17 122 lb (55.3 kg)   08/16/17 121 lb 6.4 oz (55.1 kg)     Temp Readings from Last 3 Encounters:   09/14/17 98.2 °F (36.8 °C)   08/25/17 98.1 °F (36.7 °C)   08/16/17 98.3 °F (36.8 °C) (Oral)     BP Readings from Last 3 Encounters:   09/14/17 164/92   08/25/17 160/96   08/16/17 159/79     Pulse Readings from Last 3 Encounters:   09/14/17 91   08/16/17 80   12/28/16 76     Body mass index is 23.51 kg/(m^2).    Physical Exam   Constitutional: She is oriented to person, place, and time. She appears  well-developed and well-nourished.   HENT:   Head: Normocephalic.   Mouth/Throat: Oropharynx is clear and moist.   Eyes: Conjunctivae and EOM are normal. Pupils are equal, round, and reactive to light.   Neck: Normal range of motion. Neck supple. No thyromegaly present.   Cardiovascular: Normal rate, regular rhythm and normal heart sounds.    Pulmonary/Chest: Effort normal and breath sounds normal.   Abdominal: Soft. Bowel sounds are normal. There is no hepatosplenomegaly.   Musculoskeletal: Normal range of motion. She exhibits no edema.   Lymphadenopathy:     She has no cervical adenopathy.   Neurological: She is alert and oriented to person, place, and time.   Skin: Skin is warm and dry. No rash noted.   Psychiatric: She has a normal mood and affect. Her behavior is normal.   Vitals reviewed.          Assessment/Plan   Milan was seen today for hypertension.    Diagnoses and all orders for this visit:    Essential hypertension  -     lisinopril (PRINIVIL,ZESTRIL) 10 MG tablet; Take 1 tablet by mouth Daily.  -     Comprehensive Metabolic Panel  -     Lipid Panel With / Chol / HDL Ratio  -     TSH    Chronic obstructive pulmonary disease, unspecified COPD type    Neck pain    Anxiety    Smoker  -     CT Chest Low Dose Wo; Future    Gastroesophageal reflux disease without esophagitis  -     CBC & Differential      1. HTN. Not optimally controlled.  Increase lisinopril to 10 mg daily.  Monitor home blood pressures.  Call if BP staying >140/>90.    2. COPD.  No flare.  Continue same.    3. Chronic back/neck pain.  Continue same per pain management.    4. Anxiety.  Continue same.    5. Smoking.  Pt counseled.  Low dose screening CT.    6. GERD.  Controlled with PPI.  She sees Dr. Morales.      Current Outpatient Prescriptions:   •  baclofen (LIORESAL) 10 MG tablet, Take 10 mg by mouth 3 (Three) Times a Day., Disp: , Rfl:   •  diazePAM (VALIUM) 5 MG tablet, Take 5 mg by mouth., Disp: , Rfl:   •  doxycycline  (VIBRAMYCIN) 50 MG capsule, Take 50 mg by mouth Daily., Disp: , Rfl:   •  lisinopril (PRINIVIL,ZESTRIL) 10 MG tablet, Take 1 tablet by mouth Daily., Disp: 90 tablet, Rfl: 1  •  methadone (DOLOPHINE) 5 MG tablet, Take 5 mg by mouth Every 6 (Six) Hours As Needed for Moderate Pain ., Disp: , Rfl:   •  omeprazole (priLOSEC) 20 MG capsule, Take 20 mg by mouth 3 (Three) Times a Day., Disp: , Rfl:   •  oxyCODONE-acetaminophen (PERCOCET) 5-325 MG per tablet, Take 1-2 tablets by mouth Every 4 (Four) Hours As Needed for severe pain (7-10) (Pain)., Disp: 60 tablet, Rfl: 0  •  pregabalin (LYRICA) 75 MG capsule, Take 75 mg by mouth 2 (two) times a day., Disp: , Rfl:   •  SYMBICORT 160-4.5 MCG/ACT inhaler, USE 2 INHALATIONS TWICE A DAY (RINSE MOUTH AFTER USE), Disp: 30.6 g, Rfl: 2  Medications Discontinued During This Encounter   Medication Reason   • promethazine (PHENERGAN) 12.5 MG tablet Therapy completed   • lisinopril (PRINIVIL,ZESTRIL) 5 MG tablet Reorder       Return in about 3 months (around 12/14/2017).

## 2017-09-21 ENCOUNTER — HOSPITAL ENCOUNTER (OUTPATIENT)
Dept: CT IMAGING | Facility: HOSPITAL | Age: 62
Discharge: HOME | End: 2017-09-21
Admitting: INTERNAL MEDICINE

## 2017-09-21 PROCEDURE — 0 IOPAMIDOL 61 % SOLUTION: Performed by: INTERNAL MEDICINE

## 2017-09-21 PROCEDURE — 74160 CT ABDOMEN W/CONTRAST: CPT

## 2017-09-21 RX ADMIN — IOPAMIDOL 100 ML: 612 INJECTION, SOLUTION INTRAVENOUS at 12:15

## 2018-01-11 ENCOUNTER — OFFICE VISIT (OUTPATIENT)
Dept: INTERNAL MEDICINE | Facility: CLINIC | Age: 63
End: 2018-01-11

## 2018-01-11 VITALS
WEIGHT: 114.6 LBS | HEART RATE: 87 BPM | OXYGEN SATURATION: 95 % | BODY MASS INDEX: 22.5 KG/M2 | DIASTOLIC BLOOD PRESSURE: 60 MMHG | HEIGHT: 60 IN | TEMPERATURE: 98.7 F | SYSTOLIC BLOOD PRESSURE: 140 MMHG

## 2018-01-11 DIAGNOSIS — M54.9 CHRONIC BACK PAIN, UNSPECIFIED BACK LOCATION, UNSPECIFIED BACK PAIN LATERALITY: ICD-10-CM

## 2018-01-11 DIAGNOSIS — G89.29 CHRONIC BACK PAIN, UNSPECIFIED BACK LOCATION, UNSPECIFIED BACK PAIN LATERALITY: ICD-10-CM

## 2018-01-11 DIAGNOSIS — F17.200 SMOKER: ICD-10-CM

## 2018-01-11 DIAGNOSIS — K21.9 GASTROESOPHAGEAL REFLUX DISEASE WITHOUT ESOPHAGITIS: ICD-10-CM

## 2018-01-11 DIAGNOSIS — F41.9 ANXIETY: ICD-10-CM

## 2018-01-11 DIAGNOSIS — J44.9 CHRONIC OBSTRUCTIVE PULMONARY DISEASE, UNSPECIFIED COPD TYPE (HCC): ICD-10-CM

## 2018-01-11 DIAGNOSIS — I10 ESSENTIAL HYPERTENSION: Primary | ICD-10-CM

## 2018-01-11 PROCEDURE — 99214 OFFICE O/P EST MOD 30 MIN: CPT | Performed by: FAMILY MEDICINE

## 2018-01-11 NOTE — PROGRESS NOTES
"Subjective     Milan Hinds is a 62 y.o. female, who presents with a chief complaint of   Chief Complaint   Patient presents with   • Hypertension       Hypertension   Associated symptoms include neck pain.   URI    Associated symptoms include neck pain.      1. HTN.  Tolerating medication.  Denies chest pain, dizziness, chest pain, headaches.  We increased the lisinopril to 10 mg last time.  Home blood pressures running 140s-150s/80s.    2. COPD.  Pt denies wheezing, shortness of breath.  She has been ill the past 4 days with \"flu\" with cough, body aches, fever, but is feeling a lot better today.    3. Chronic neck pain.  She is followed by pain management.  Symptoms are stable.    4. Smoking.  She has cut down to 1/2 PPD.    The following portions of the patient's history were reviewed and updated as appropriate: allergies, current medications, past family history, past medical history, past social history, past surgical history and problem list.    Allergies: No known drug allergy    Review of Systems   Constitutional: Negative.    HENT: Negative.    Eyes: Negative.    Respiratory: Negative.    Cardiovascular: Negative.    Gastrointestinal: Negative.    Endocrine: Negative.    Genitourinary: Negative.    Musculoskeletal: Positive for back pain and neck pain.   Skin: Negative.    Allergic/Immunologic: Positive for environmental allergies.   Neurological: Negative.    Hematological: Negative.    Psychiatric/Behavioral: The patient is nervous/anxious.        Objective     Wt Readings from Last 3 Encounters:   01/11/18 52 kg (114 lb 9.6 oz)   09/14/17 54.6 kg (120 lb 6.4 oz)   08/25/17 55.3 kg (122 lb)     Temp Readings from Last 3 Encounters:   01/11/18 98.7 °F (37.1 °C)   09/14/17 98.2 °F (36.8 °C)   08/25/17 98.1 °F (36.7 °C)     BP Readings from Last 3 Encounters:   01/11/18 140/60   09/14/17 164/92   08/25/17 160/96     Pulse Readings from Last 3 Encounters:   01/11/18 87   09/14/17 91   08/16/17 80     Body " mass index is 22.38 kg/(m^2).    Physical Exam   Constitutional: She is oriented to person, place, and time. She appears well-developed and well-nourished.   HENT:   Head: Normocephalic.   Mouth/Throat: Oropharynx is clear and moist.   Eyes: Conjunctivae and EOM are normal. Pupils are equal, round, and reactive to light.   Neck: Normal range of motion. Neck supple. No thyromegaly present.   Cardiovascular: Normal rate, regular rhythm and normal heart sounds.    Pulmonary/Chest: Effort normal and breath sounds normal.   Abdominal: Soft. Bowel sounds are normal. There is no hepatosplenomegaly.   Musculoskeletal: Normal range of motion. She exhibits no edema.   Lymphadenopathy:     She has no cervical adenopathy.   Neurological: She is alert and oriented to person, place, and time.   Skin: Skin is warm and dry. No rash noted.   Psychiatric: She has a normal mood and affect. Her behavior is normal.   Vitals reviewed.          Assessment/Plan   Milan was seen today for hypertension.    Diagnoses and all orders for this visit:    Essential hypertension  -     Comprehensive Metabolic Panel; Future  -     Lipid Panel With / Chol / HDL Ratio; Future  -     TSH; Future    Chronic obstructive pulmonary disease, unspecified COPD type    Chronic back pain, unspecified back location, unspecified back pain laterality    Anxiety    Smoker    Gastroesophageal reflux disease without esophagitis  -     CBC & Differential; Future      1. HTN. Not optimally controlled.  Increase lisinopril to 15 mg daily.  Monitor home blood pressures.  Call if BP staying >140/>90.  F/U 3 months with labs.    2. COPD.  No flare.  Continue same.  Warning s/sxs discussed.    3. Chronic back/neck pain.  Continue same per pain management.    4. Anxiety.  Continue same.    5. Smoking.  Pt counseled.  Low dose screening CT has been scheduled.    6. GERD.  Controlled with PPI.  She sees Dr. Morales.    7. Routine health maint.  Mammogram UTD.  Low dose CT  chest pending.  Colonoscopy due next year.  She has had a pneumococcal vaccine.      Current Outpatient Prescriptions:   •  baclofen (LIORESAL) 10 MG tablet, Take 10 mg by mouth 3 (Three) Times a Day., Disp: , Rfl:   •  diazePAM (VALIUM) 5 MG tablet, Take 5 mg by mouth., Disp: , Rfl:   •  doxycycline (VIBRAMYCIN) 50 MG capsule, Take 50 mg by mouth Daily., Disp: , Rfl:   •  lisinopril (PRINIVIL,ZESTRIL) 10 MG tablet, Take 1 tablet by mouth Daily., Disp: 90 tablet, Rfl: 1  •  omeprazole (priLOSEC) 20 MG capsule, Take 20 mg by mouth 3 (Three) Times a Day., Disp: , Rfl:   •  oxyCODONE-acetaminophen (PERCOCET) 5-325 MG per tablet, Take 1-2 tablets by mouth Every 4 (Four) Hours As Needed for severe pain (7-10) (Pain). (Patient taking differently: Take 1-2 tablets by mouth Every 4 (Four) Hours As Needed for Severe Pain  (Pain). Taking 4 a day), Disp: 60 tablet, Rfl: 0  •  pregabalin (LYRICA) 75 MG capsule, Take 75 mg by mouth 2 (two) times a day., Disp: , Rfl:   •  SYMBICORT 160-4.5 MCG/ACT inhaler, USE 2 INHALATIONS TWICE A DAY (RINSE MOUTH AFTER USE), Disp: 30.6 g, Rfl: 2  Medications Discontinued During This Encounter   Medication Reason   • methadone (DOLOPHINE) 5 MG tablet Therapy completed       Return in about 3 months (around 4/11/2018).

## 2018-02-12 ENCOUNTER — CLINICAL SUPPORT (OUTPATIENT)
Dept: OTHER | Facility: HOSPITAL | Age: 63
End: 2018-02-12

## 2018-02-12 DIAGNOSIS — Z12.2 ENCOUNTER FOR SCREENING FOR LUNG CANCER: ICD-10-CM

## 2018-02-12 DIAGNOSIS — F17.210 SMOKING GREATER THAN 30 PACK YEARS: Primary | ICD-10-CM

## 2018-02-12 RX ORDER — DOXYCYCLINE HYCLATE 50 MG/1
CAPSULE ORAL
COMMUNITY
Start: 2016-04-11 | End: 2018-09-02

## 2018-02-12 RX ORDER — OMEPRAZOLE 20 MG/1
20 CAPSULE, DELAYED RELEASE ORAL
COMMUNITY
End: 2018-09-02

## 2018-02-12 RX ORDER — OXYCODONE AND ACETAMINOPHEN 7.5; 325 MG/1; MG/1
TABLET ORAL
COMMUNITY
Start: 2018-02-04 | End: 2019-07-12

## 2018-02-12 RX ORDER — BACLOFEN 10 MG/1
10 TABLET ORAL
COMMUNITY
Start: 2015-02-18 | End: 2019-07-12

## 2018-02-12 RX ORDER — PREGABALIN 75 MG/1
75 CAPSULE ORAL
COMMUNITY
Start: 2015-02-18 | End: 2018-09-02

## 2018-02-12 RX ORDER — LISINOPRIL 5 MG/1
5 TABLET ORAL
COMMUNITY
End: 2018-09-02

## 2018-02-12 RX ORDER — PROMETHAZINE HYDROCHLORIDE 12.5 MG/1
TABLET ORAL
COMMUNITY
Start: 2018-01-01 | End: 2018-09-02

## 2018-02-12 RX ORDER — BUDESONIDE AND FORMOTEROL FUMARATE DIHYDRATE 160; 4.5 UG/1; UG/1
AEROSOL RESPIRATORY (INHALATION)
COMMUNITY
Start: 2017-03-27 | End: 2019-07-12

## 2018-02-12 RX ORDER — OXYCODONE HCL 10 MG/1
10 TABLET, FILM COATED, EXTENDED RELEASE ORAL 3 TIMES DAILY PRN
COMMUNITY
Start: 2017-06-28

## 2018-02-23 DIAGNOSIS — I10 ESSENTIAL HYPERTENSION: ICD-10-CM

## 2018-02-23 RX ORDER — LISINOPRIL 10 MG/1
TABLET ORAL
Qty: 90 TABLET | Refills: 1 | Status: SHIPPED | OUTPATIENT
Start: 2018-02-23 | End: 2019-07-12

## 2018-03-22 ENCOUNTER — DOCUMENTATION (OUTPATIENT)
Dept: OTHER | Facility: CLINIC | Age: 63
End: 2018-03-22

## 2018-03-22 NOTE — PROGRESS NOTES
Patient was referred for a low-dose lung cancer screening to [facility] by Dr. Des Weir.  She was scheduled for shared decision making and a LDCT scan on 2/12/2018.  Patient did not show up for the scheduled appointment.  When called she stated that she is changing doctors and she also gets medicare in March and will call us back to reschedule.  I reached out to her today by telephone and she states that she wants to hold off on low-dose lung cancer screening.  She is now being seen by Dr. Rj Warner as her PCP and she has recently had bronchitis and will discuss with her new PCP at the next visit.  She states that she will call us if she needs a screening in the future.  I have cancelled my order for a low-dose lung cancer screening.  She is aware that if she decided to be screened, she may call us back in the future to be scheduled.

## 2018-08-02 ENCOUNTER — TRANSCRIBE ORDERS (OUTPATIENT)
Dept: ADMINISTRATIVE | Facility: HOSPITAL | Age: 63
End: 2018-08-02

## 2018-08-02 ENCOUNTER — HOSPITAL ENCOUNTER (OUTPATIENT)
Dept: GENERAL RADIOLOGY | Facility: HOSPITAL | Age: 63
Discharge: HOME | End: 2018-08-02
Attending: ORTHOPAEDIC SURGERY | Admitting: ORTHOPAEDIC SURGERY

## 2018-08-02 DIAGNOSIS — M54.2 NECK PAIN: Primary | ICD-10-CM

## 2018-08-02 DIAGNOSIS — M54.2 NECK PAIN: ICD-10-CM

## 2018-08-02 PROCEDURE — 72050 X-RAY EXAM NECK SPINE 4/5VWS: CPT

## 2018-08-06 ENCOUNTER — TRANSCRIBE ORDERS (OUTPATIENT)
Dept: ADMINISTRATIVE | Facility: HOSPITAL | Age: 63
End: 2018-08-06

## 2018-08-06 DIAGNOSIS — G45.9 TRANSIENT CEREBRAL ISCHEMIA, UNSPECIFIED TYPE: Primary | ICD-10-CM

## 2018-08-06 DIAGNOSIS — Q21.10 ATRIAL SEPTAL DEFECT: Primary | ICD-10-CM

## 2018-08-06 DIAGNOSIS — R94.31 ABNORMAL ELECTROCARDIOGRAM: ICD-10-CM

## 2018-08-13 ENCOUNTER — HOSPITAL ENCOUNTER (OUTPATIENT)
Dept: CARDIOLOGY | Facility: HOSPITAL | Age: 63
Discharge: HOME | End: 2018-08-13

## 2018-08-13 ENCOUNTER — HOSPITAL ENCOUNTER (OUTPATIENT)
Dept: CARDIOLOGY | Facility: HOSPITAL | Age: 63
Discharge: HOME | End: 2018-08-13
Admitting: FAMILY MEDICINE

## 2018-08-13 VITALS
HEIGHT: 60 IN | BODY MASS INDEX: 22.38 KG/M2 | SYSTOLIC BLOOD PRESSURE: 150 MMHG | HEART RATE: 72 BPM | WEIGHT: 114 LBS | DIASTOLIC BLOOD PRESSURE: 77 MMHG

## 2018-08-13 DIAGNOSIS — Q21.10 ATRIAL SEPTAL DEFECT: ICD-10-CM

## 2018-08-13 DIAGNOSIS — G45.9 TRANSIENT CEREBRAL ISCHEMIA, UNSPECIFIED TYPE: ICD-10-CM

## 2018-08-13 DIAGNOSIS — R94.31 ABNORMAL ELECTROCARDIOGRAM: ICD-10-CM

## 2018-08-13 LAB
AORTIC ARCH: 2.11 CM
AORTIC DIMENSIONLESS INDEX: 1 (DI)
ASCENDING AORTA: 2.1 CM
BH CV ECHO MEAS - ACS: 2.1 CM
BH CV ECHO MEAS - AO MAX PG (FULL): 0.52 MMHG
BH CV ECHO MEAS - AO MAX PG: 10.8 MMHG
BH CV ECHO MEAS - AO MEAN PG (FULL): 0 MMHG
BH CV ECHO MEAS - AO MEAN PG: 7 MMHG
BH CV ECHO MEAS - AO ROOT AREA (BSA CORRECTED): 2
BH CV ECHO MEAS - AO ROOT AREA: 7.1 CM^2
BH CV ECHO MEAS - AO ROOT DIAM: 3 CM
BH CV ECHO MEAS - AO V2 MAX: 164 CM/SEC
BH CV ECHO MEAS - AO V2 MEAN: 128 CM/SEC
BH CV ECHO MEAS - AO V2 VTI: 39.5 CM
BH CV ECHO MEAS - ASC AORTA: 2.1 CM
BH CV ECHO MEAS - AVA(I,A): 3.6 CM^2
BH CV ECHO MEAS - AVA(I,D): 3.6 CM^2
BH CV ECHO MEAS - AVA(V,A): 3.4 CM^2
BH CV ECHO MEAS - AVA(V,D): 3.4 CM^2
BH CV ECHO MEAS - BSA(HAYCOCK): 1.5 M^2
BH CV ECHO MEAS - BSA: 1.5 M^2
BH CV ECHO MEAS - BZI_BMI: 22.3 KILOGRAMS/M^2
BH CV ECHO MEAS - BZI_METRIC_HEIGHT: 152.4 CM
BH CV ECHO MEAS - BZI_METRIC_WEIGHT: 51.7 KG
BH CV ECHO MEAS - EDV(CUBED): 66.2 ML
BH CV ECHO MEAS - EDV(MOD-SP2): 55 ML
BH CV ECHO MEAS - EDV(MOD-SP4): 55.7 ML
BH CV ECHO MEAS - EDV(TEICH): 71.9 ML
BH CV ECHO MEAS - EF(CUBED): 66.8 %
BH CV ECHO MEAS - EF(MOD-BP): 62 %
BH CV ECHO MEAS - EF(MOD-SP2): 65.6 %
BH CV ECHO MEAS - EF(MOD-SP4): 59.2 %
BH CV ECHO MEAS - EF(TEICH): 58.9 %
BH CV ECHO MEAS - ESV(CUBED): 22 ML
BH CV ECHO MEAS - ESV(MOD-SP2): 18.9 ML
BH CV ECHO MEAS - ESV(MOD-SP4): 22.7 ML
BH CV ECHO MEAS - ESV(TEICH): 29.6 ML
BH CV ECHO MEAS - FS: 30.8 %
BH CV ECHO MEAS - IVS/LVPW: 1.1
BH CV ECHO MEAS - IVSD: 0.87 CM
BH CV ECHO MEAS - LAT PEAK E' VEL: 8 CM/SEC
BH CV ECHO MEAS - LV DIASTOLIC VOL/BSA (35-75): 37.9 ML/M^2
BH CV ECHO MEAS - LV MASS(C)D: 102.3 GRAMS
BH CV ECHO MEAS - LV MASS(C)DI: 69.6 GRAMS/M^2
BH CV ECHO MEAS - LV MAX PG: 10.2 MMHG
BH CV ECHO MEAS - LV MEAN PG: 7 MMHG
BH CV ECHO MEAS - LV SYSTOLIC VOL/BSA (12-30): 15.4 ML/M^2
BH CV ECHO MEAS - LV V1 MAX: 160 CM/SEC
BH CV ECHO MEAS - LV V1 MEAN: 123 CM/SEC
BH CV ECHO MEAS - LV V1 VTI: 41.2 CM
BH CV ECHO MEAS - LVIDD: 4 CM
BH CV ECHO MEAS - LVIDS: 2.8 CM
BH CV ECHO MEAS - LVLD AP2: 7.7 CM
BH CV ECHO MEAS - LVLD AP4: 8.1 CM
BH CV ECHO MEAS - LVLS AP2: 6.2 CM
BH CV ECHO MEAS - LVLS AP4: 6.8 CM
BH CV ECHO MEAS - LVOT AREA (M): 3.5 CM^2
BH CV ECHO MEAS - LVOT AREA: 3.5 CM^2
BH CV ECHO MEAS - LVOT DIAM: 2.1 CM
BH CV ECHO MEAS - LVPWD: 0.82 CM
BH CV ECHO MEAS - MED PEAK E' VEL: 6 CM/SEC
BH CV ECHO MEAS - MR MAX PG: 133.6 MMHG
BH CV ECHO MEAS - MR MAX VEL: 578 CM/SEC
BH CV ECHO MEAS - MV A DUR: 0.11 SEC
BH CV ECHO MEAS - MV A MAX VEL: 119 CM/SEC
BH CV ECHO MEAS - MV DEC SLOPE: 408 CM/SEC^2
BH CV ECHO MEAS - MV DEC TIME: 0.23 SEC
BH CV ECHO MEAS - MV E MAX VEL: 99.2 CM/SEC
BH CV ECHO MEAS - MV E/A: 0.83
BH CV ECHO MEAS - MV MAX PG: 6.4 MMHG
BH CV ECHO MEAS - MV MEAN PG: 3 MMHG
BH CV ECHO MEAS - MV P1/2T MAX VEL: 121 CM/SEC
BH CV ECHO MEAS - MV P1/2T: 86.9 MSEC
BH CV ECHO MEAS - MV V2 MAX: 126 CM/SEC
BH CV ECHO MEAS - MV V2 MEAN: 88.2 CM/SEC
BH CV ECHO MEAS - MV V2 VTI: 50.6 CM
BH CV ECHO MEAS - MVA P1/2T LCG: 1.8 CM^2
BH CV ECHO MEAS - MVA(P1/2T): 2.5 CM^2
BH CV ECHO MEAS - MVA(VTI): 2.8 CM^2
BH CV ECHO MEAS - PA ACC TIME: 0.13 SEC
BH CV ECHO MEAS - PA MAX PG (FULL): 1.2 MMHG
BH CV ECHO MEAS - PA MAX PG: 4.6 MMHG
BH CV ECHO MEAS - PA PR(ACCEL): 21.9 MMHG
BH CV ECHO MEAS - PA V2 MAX: 107 CM/SEC
BH CV ECHO MEAS - PULM A REVS DUR: 0.11 SEC
BH CV ECHO MEAS - PULM A REVS VEL: 36.6 CM/SEC
BH CV ECHO MEAS - PULM DIAS VEL: 44.9 CM/SEC
BH CV ECHO MEAS - PULM S/D: 1.7
BH CV ECHO MEAS - PULM SYS VEL: 76.6 CM/SEC
BH CV ECHO MEAS - PVA(V,A): 2.2 CM^2
BH CV ECHO MEAS - PVA(V,D): 2.2 CM^2
BH CV ECHO MEAS - QP/QS: 0.38
BH CV ECHO MEAS - RAP SYSTOLE: 15 MMHG
BH CV ECHO MEAS - RV MAX PG: 3.4 MMHG
BH CV ECHO MEAS - RV MEAN PG: 2 MMHG
BH CV ECHO MEAS - RV V1 MAX: 92.3 CM/SEC
BH CV ECHO MEAS - RV V1 MEAN: 70.7 CM/SEC
BH CV ECHO MEAS - RV V1 VTI: 21.4 CM
BH CV ECHO MEAS - RVOT AREA: 2.5 CM^2
BH CV ECHO MEAS - RVOT DIAM: 1.8 CM
BH CV ECHO MEAS - RVSP: 26 MMHG
BH CV ECHO MEAS - SI(AO): 189.9 ML/M^2
BH CV ECHO MEAS - SI(CUBED): 30.1 ML/M^2
BH CV ECHO MEAS - SI(LVOT): 97.1 ML/M^2
BH CV ECHO MEAS - SI(MOD-SP2): 24.6 ML/M^2
BH CV ECHO MEAS - SI(MOD-SP4): 22.4 ML/M^2
BH CV ECHO MEAS - SI(TEICH): 28.8 ML/M^2
BH CV ECHO MEAS - SUP REN AO DIAM: 1.6 CM
BH CV ECHO MEAS - SV(AO): 279.2 ML
BH CV ECHO MEAS - SV(CUBED): 44.2 ML
BH CV ECHO MEAS - SV(LVOT): 142.7 ML
BH CV ECHO MEAS - SV(MOD-SP2): 36.1 ML
BH CV ECHO MEAS - SV(MOD-SP4): 33 ML
BH CV ECHO MEAS - SV(RVOT): 54.5 ML
BH CV ECHO MEAS - SV(TEICH): 42.3 ML
BH CV ECHO MEAS - TAPSE (>1.6): 3.05 CM2
BH CV ECHO MEAS - TR MAX VEL: 163 CM/SEC
BH CV ECHO MEASUREMENTS AVERAGE E/E' RATIO: 14.17
BH CV VAS BP RIGHT ARM: NORMAL MMHG
BH CV XLRA - RV BASE: 2.79 CM
BH CV XLRA - TDI S': 10.9 CM/SEC
LEFT ATRIUM VOLUME INDEX: 18 ML/M2
MAXIMAL PREDICTED HEART RATE: 156 BPM
STRESS TARGET HR: 133 BPM

## 2018-08-13 PROCEDURE — 93306 TTE W/DOPPLER COMPLETE: CPT

## 2018-08-13 PROCEDURE — 93306 TTE W/DOPPLER COMPLETE: CPT | Performed by: INTERNAL MEDICINE

## 2018-09-02 ENCOUNTER — APPOINTMENT (OUTPATIENT)
Dept: GENERAL RADIOLOGY | Facility: HOSPITAL | Age: 63
End: 2018-09-02

## 2018-09-02 ENCOUNTER — HOSPITAL ENCOUNTER (EMERGENCY)
Facility: HOSPITAL | Age: 63
Discharge: HOME | End: 2018-09-02
Attending: EMERGENCY MEDICINE | Admitting: EMERGENCY MEDICINE

## 2018-09-02 VITALS
HEIGHT: 61 IN | BODY MASS INDEX: 21.34 KG/M2 | WEIGHT: 113 LBS | TEMPERATURE: 98.6 F | HEART RATE: 87 BPM | OXYGEN SATURATION: 95 % | SYSTOLIC BLOOD PRESSURE: 157 MMHG | DIASTOLIC BLOOD PRESSURE: 90 MMHG | RESPIRATION RATE: 18 BRPM

## 2018-09-02 DIAGNOSIS — S93.491A SPRAIN OF ANTERIOR TALOFIBULAR LIGAMENT OF RIGHT ANKLE, INITIAL ENCOUNTER: Primary | ICD-10-CM

## 2018-09-02 PROCEDURE — 99283 EMERGENCY DEPT VISIT LOW MDM: CPT

## 2018-09-02 PROCEDURE — 99282 EMERGENCY DEPT VISIT SF MDM: CPT | Performed by: EMERGENCY MEDICINE

## 2018-09-02 PROCEDURE — 73610 X-RAY EXAM OF ANKLE: CPT

## 2018-09-02 NOTE — ED PROVIDER NOTES
"Subjective     History provided by:  Patient    History of Present Illness    · Chief complaint: Right ankle injury    · Location: Right ankle    · Quality/Severity: Swelling and pain of right ankle after stepping in a hole.  She states she hurt her ankle \"pop\".    · Timing/Onset: The patient states she stepped in a hole at 5 PM yesterday afternoon.    · Modifying Factors: She is able to bear weight, but limps on her right ankle.    · Associated symptoms: She denies other injuries.    · Narrative: The patient is a 63-year-old white female that stepped in a hole yesterday afternoon injuring her right ankle.  She states she heard it \"pop\"and has since had pain and swelling on the lateral aspect.  She states she has been able to walk with a limp.  She denies prior injuries to that ankle.  She has a history of COPD due to smoking, continues to smoke 1 pack per day.  She is a history of chronic back and neck pain for which she is in pain management.    ED Triage Vitals [09/02/18 1404]   Temp Heart Rate Resp BP SpO2   98.6 °F (37 °C) 87 18 157/90 95 %      Temp src Heart Rate Source Patient Position BP Location FiO2 (%)   -- -- -- -- --       Review of Systems   Constitutional: Negative for activity change, appetite change, chills and fever.   HENT: Negative for congestion, ear pain, facial swelling, rhinorrhea and sore throat.    Eyes: Negative for pain and visual disturbance.   Respiratory: Negative for cough and shortness of breath.    Cardiovascular: Negative for chest pain.   Gastrointestinal: Negative for abdominal pain, diarrhea, nausea and vomiting.   Genitourinary: Negative for difficulty urinating and dysuria.   Musculoskeletal: Positive for gait problem (due to right ankle pain). Negative for arthralgias, back pain, neck pain and neck stiffness.   Skin: Negative for color change and rash.   Neurological: Negative for dizziness, weakness, numbness and headaches.   Psychiatric/Behavioral: Negative for confusion " and sleep disturbance.       Past Medical History:   Diagnosis Date   • Anxiety    • Arthritis    • Back pain    • Carotid artery stenosis    • Cataract    • Chronic pain     has had epidural injections, goes to [facility]   • COPD (chronic obstructive pulmonary disease) (CMS/HCC)    • Diverticular disease    • Dysphagia     history of   • Failed back syndrome, lumbar     failed fusion   • Frequent falls     history of, none recently   • GERD (gastroesophageal reflux disease)    • History of transfusion    • Hyperlipidemia    • Hypertension    • IBS (irritable bowel syndrome)     history of   • Murmur    • Sciatic pain     bilateral   • Vitamin D deficiency        Allergies   Allergen Reactions   • No Known Drug Allergy        Past Surgical History:   Procedure Laterality Date   • BACK SURGERY      lumbar fusion   • BLADDER SURGERY      bladder lift   • BUNIONECTOMY Left 2016    Procedure: LAPIDUS BUNIONECTOMY LEFT FOOT;  Surgeon: Raphael Storm DPM;  Location: [facility] OR;  Service:    •  SECTION      x2   • COLONOSCOPY      normal per pt.   • ENDOSCOPY N/A 2017    Procedure: ESOPHAGOGASTRODUODENOSCOPY w/ biopsies;  Surgeon: Garth Morales MD;  Location: [facility] OR;  Service:    • HYSTERECTOMY     • INSERTION / REMOVAL EPIDURAL SPINAL NEUROSTIMULATOR     • NECK SURGERY      C6C7 fusion   • REPLACEMENT TOTAL KNEE Left        Family History   Problem Relation Age of Onset   • Pancreatic cancer Sister    • Pancreatic cancer Sister        Social History     Social History   • Marital status:    • Years of education: Some college     Occupational History   •       Social History Main Topics   • Smoking status: Current Every Day Smoker     Packs/day: 1.00     Years: 45.00     Types: Cigarettes   • Smokeless tobacco: Never Used   • Alcohol use No   • Drug use: No   • Sexual activity: Defer     Other Topics Concern   • Not on file            Objective   Physical Exam   Constitutional: She is oriented to person, place, and time. She appears well-developed and well-nourished. No distress.   A she appears healthy in no acute distress.  She is a thin body build.  Review of vital signs: Her temperature normal 98.6, blood pressure slightly elevated 157/90, the remainder of her vital signs are within normal limits.   HENT:   Head: Normocephalic and atraumatic.   Eyes: Pupils are equal, round, and reactive to light. Conjunctivae are normal.   Neck: Normal range of motion.   Musculoskeletal:   The patient's right ankle has swelling over the lateral aspect with soft tissue tenderness.  There is no deformity of the right ankle.  There is no ligament laxity right ankle.  Right foot has swelling on the adjacent proximal foot to the lateral right ankle, but there is no tenderness of the fifth metatarsal and no deformity of the foot.  The foot is neurovascularly intact.  Her right knee is nontender without deformity.  She has no tenderness, swelling or deformity to her right leg above the ankle.   Neurological: She is alert and oriented to person, place, and time. No cranial nerve deficit.   No focal motor sensory deficit.   Skin: Skin is warm and dry. Capillary refill takes less than 2 seconds. No rash noted. She is not diaphoretic. No erythema. No pallor.   Psychiatric: She has a normal mood and affect. Her behavior is normal. Judgment and thought content normal.   Nursing note and vitals reviewed.      Procedures           ED Course  ED Course as of Sep 02 1450   Sun Sep 02, 2018   1412 [facility] Report [id number] shows the patient to be in pain management filling oxycodone on 15 mg #90 monthly last filled 8/9/18 prescribed by a Dr Lee.  [TP]   3730 I interpreted the patient's x-ray of her right ankle is no fracture or dislocation.  Is my impression she has a sprain of her right ankle.  She was placed in a stirrup splint intact.  Her right foot is  neurovascularly intact after placement.  The patient instructed to keep her ankle elevated and apply ice.  She states that she has a walker at home that she can use.  She is in pain management, so no additional pain medications were prescribed.  The patient has an appointment with her PCP Rj Warner for this Tuesday which I instructed her to keep.  [TP]      ED Course User Index  [TP] Sven Perry MD                  MDM  Number of Diagnoses or Management Options  Sprain of anterior talofibular ligament of right ankle, initial encounter: new and requires workup     Amount and/or Complexity of Data Reviewed  Tests in the radiology section of CPT®: ordered and reviewed  Independent visualization of images, tracings, or specimens: yes    Patient Progress  Patient progress: stable        Final diagnoses:   Sprain of anterior talofibular ligament of right ankle, initial encounter           Labs Reviewed - No data to display  XR Ankle 3+ View Right   ED Interpretation   No fracture or dislocation.             Medication List      No changes were made to your prescriptions during this visit.            Sven Perry MD  09/02/18 0823

## 2018-10-11 ENCOUNTER — TRANSCRIBE ORDERS (OUTPATIENT)
Dept: ADMINISTRATIVE | Facility: HOSPITAL | Age: 63
End: 2018-10-11

## 2018-10-11 DIAGNOSIS — M50.323 DEGENERATION OF INTERVERTEBRAL DISC AT C6-C7 LEVEL: Primary | ICD-10-CM

## 2018-10-18 ENCOUNTER — HOSPITAL ENCOUNTER (OUTPATIENT)
Dept: CARDIOLOGY | Facility: HOSPITAL | Age: 63
Discharge: HOME | End: 2018-10-18
Attending: ORTHOPAEDIC SURGERY

## 2018-10-18 ENCOUNTER — HOSPITAL ENCOUNTER (OUTPATIENT)
Dept: CT IMAGING | Facility: HOSPITAL | Age: 63
Discharge: HOME | End: 2018-10-18
Attending: ORTHOPAEDIC SURGERY | Admitting: ORTHOPAEDIC SURGERY

## 2018-10-18 ENCOUNTER — HOSPITAL ENCOUNTER (OUTPATIENT)
Dept: GENERAL RADIOLOGY | Facility: HOSPITAL | Age: 63
Discharge: HOME | End: 2018-10-18
Attending: ORTHOPAEDIC SURGERY

## 2018-10-18 ENCOUNTER — TRANSCRIBE ORDERS (OUTPATIENT)
Dept: ADMINISTRATIVE | Facility: HOSPITAL | Age: 63
End: 2018-10-18

## 2018-10-18 ENCOUNTER — LAB (OUTPATIENT)
Dept: LAB | Facility: HOSPITAL | Age: 63
End: 2018-10-18
Attending: ORTHOPAEDIC SURGERY

## 2018-10-18 DIAGNOSIS — Z86.73 HISTORY OF STROKE: ICD-10-CM

## 2018-10-18 DIAGNOSIS — N88.2 CERVICAL OS STENOSIS: ICD-10-CM

## 2018-10-18 DIAGNOSIS — M50.323 DEGENERATION OF INTERVERTEBRAL DISC AT C6-C7 LEVEL: ICD-10-CM

## 2018-10-18 DIAGNOSIS — N88.2 CERVICAL OS STENOSIS: Primary | ICD-10-CM

## 2018-10-18 LAB
ALBUMIN SERPL-MCNC: 3.9 G/DL (ref 3.5–5.2)
ALBUMIN/GLOB SERPL: 1.4 G/DL
ALP SERPL-CCNC: 92 U/L (ref 40–129)
ALT SERPL W P-5'-P-CCNC: <5 U/L (ref 5–33)
ANION GAP SERPL CALCULATED.3IONS-SCNC: 8.4 MMOL/L
APTT PPP: 38.3 SECONDS (ref 24.3–38.1)
AST SERPL-CCNC: 16 U/L (ref 5–32)
BACTERIA UR QL AUTO: ABNORMAL /HPF
BILIRUB SERPL-MCNC: 0.2 MG/DL (ref 0.2–1.2)
BILIRUB UR QL STRIP: NEGATIVE
BUN BLD-MCNC: 7 MG/DL (ref 8–23)
BUN/CREAT SERPL: 9.1 (ref 7–25)
CALCIUM SPEC-SCNC: 8.8 MG/DL (ref 8.8–10.5)
CHLORIDE SERPL-SCNC: 102 MMOL/L (ref 98–107)
CLARITY UR: ABNORMAL
CO2 SERPL-SCNC: 27.6 MMOL/L (ref 22–29)
COLOR UR: ABNORMAL
CREAT BLD-MCNC: 0.77 MG/DL (ref 0.57–1)
GFR SERPL CREATININE-BSD FRML MDRD: 75 ML/MIN/1.73
GLOBULIN UR ELPH-MCNC: 2.7 GM/DL
GLUCOSE BLD-MCNC: 73 MG/DL (ref 65–99)
GLUCOSE UR STRIP-MCNC: NEGATIVE MG/DL
HGB UR QL STRIP.AUTO: NEGATIVE
HYALINE CASTS UR QL AUTO: ABNORMAL /LPF
INR PPP: 1 (ref 0.9–1.1)
KETONES UR QL STRIP: NEGATIVE
LEUKOCYTE ESTERASE UR QL STRIP.AUTO: ABNORMAL
NITRITE UR QL STRIP: NEGATIVE
PH UR STRIP.AUTO: 5.5 [PH] (ref 4.5–8)
POTASSIUM BLD-SCNC: 3.6 MMOL/L (ref 3.5–5.2)
PROT SERPL-MCNC: 6.6 G/DL (ref 6–8.5)
PROT UR QL STRIP: NEGATIVE
PROTHROMBIN TIME: 13.2 SECONDS (ref 12.1–15)
RBC # UR: ABNORMAL /HPF
REF LAB TEST METHOD: ABNORMAL
SODIUM BLD-SCNC: 138 MMOL/L (ref 136–145)
SP GR UR STRIP: 1.01 (ref 1–1.03)
SQUAMOUS #/AREA URNS HPF: ABNORMAL /HPF
TRANS CELLS #/AREA URNS HPF: ABNORMAL /HPF
UROBILINOGEN UR QL STRIP: ABNORMAL
WBC UR QL AUTO: ABNORMAL /HPF

## 2018-10-18 PROCEDURE — 36415 COLL VENOUS BLD VENIPUNCTURE: CPT

## 2018-10-18 PROCEDURE — 81001 URINALYSIS AUTO W/SCOPE: CPT

## 2018-10-18 PROCEDURE — 85730 THROMBOPLASTIN TIME PARTIAL: CPT

## 2018-10-18 PROCEDURE — 93010 ELECTROCARDIOGRAM REPORT: CPT | Performed by: INTERNAL MEDICINE

## 2018-10-18 PROCEDURE — 87186 SC STD MICRODIL/AGAR DIL: CPT

## 2018-10-18 PROCEDURE — 71046 X-RAY EXAM CHEST 2 VIEWS: CPT

## 2018-10-18 PROCEDURE — 85025 COMPLETE CBC W/AUTO DIFF WBC: CPT | Performed by: FAMILY MEDICINE

## 2018-10-18 PROCEDURE — 72125 CT NECK SPINE W/O DYE: CPT

## 2018-10-18 PROCEDURE — 87077 CULTURE AEROBIC IDENTIFY: CPT

## 2018-10-18 PROCEDURE — 85610 PROTHROMBIN TIME: CPT

## 2018-10-18 PROCEDURE — 93005 ELECTROCARDIOGRAM TRACING: CPT | Performed by: ORTHOPAEDIC SURGERY

## 2018-10-18 PROCEDURE — 80053 COMPREHEN METABOLIC PANEL: CPT

## 2018-10-18 PROCEDURE — 87086 URINE CULTURE/COLONY COUNT: CPT

## 2018-10-18 PROCEDURE — 87081 CULTURE SCREEN ONLY: CPT

## 2018-10-20 LAB
BACTERIA SPEC AEROBE CULT: ABNORMAL
MRSA SPEC QL CULT: NORMAL

## 2018-12-06 ENCOUNTER — HOSPITAL ENCOUNTER (OUTPATIENT)
Dept: GENERAL RADIOLOGY | Facility: HOSPITAL | Age: 63
Discharge: HOME | End: 2018-12-06
Admitting: ORTHOPAEDIC SURGERY

## 2018-12-06 ENCOUNTER — TRANSCRIBE ORDERS (OUTPATIENT)
Dept: ADMINISTRATIVE | Facility: HOSPITAL | Age: 63
End: 2018-12-06

## 2018-12-06 DIAGNOSIS — G89.18 POST-OP PAIN: ICD-10-CM

## 2018-12-06 DIAGNOSIS — G89.18 POST-OP PAIN: Primary | ICD-10-CM

## 2018-12-06 PROCEDURE — 72040 X-RAY EXAM NECK SPINE 2-3 VW: CPT

## 2019-01-31 ENCOUNTER — TRANSCRIBE ORDERS (OUTPATIENT)
Dept: ADMINISTRATIVE | Facility: HOSPITAL | Age: 64
End: 2019-01-31

## 2019-01-31 ENCOUNTER — HOSPITAL ENCOUNTER (OUTPATIENT)
Dept: GENERAL RADIOLOGY | Facility: HOSPITAL | Age: 64
Discharge: HOME | End: 2019-01-31
Attending: ORTHOPAEDIC SURGERY | Admitting: ORTHOPAEDIC SURGERY

## 2019-01-31 DIAGNOSIS — M54.2 NECK PAIN: Primary | ICD-10-CM

## 2019-01-31 DIAGNOSIS — M54.2 NECK PAIN: ICD-10-CM

## 2019-01-31 PROCEDURE — 72040 X-RAY EXAM NECK SPINE 2-3 VW: CPT

## 2019-07-08 ENCOUNTER — OFFICE VISIT (OUTPATIENT)
Dept: SURGERY | Facility: CLINIC | Age: 64
End: 2019-07-08

## 2019-07-08 VITALS
HEIGHT: 61 IN | HEART RATE: 72 BPM | RESPIRATION RATE: 16 BRPM | BODY MASS INDEX: 23.79 KG/M2 | SYSTOLIC BLOOD PRESSURE: 142 MMHG | WEIGHT: 126 LBS | DIASTOLIC BLOOD PRESSURE: 74 MMHG

## 2019-07-08 DIAGNOSIS — K80.20 GALLSTONES: Primary | ICD-10-CM

## 2019-07-08 PROCEDURE — 99204 OFFICE O/P NEW MOD 45 MIN: CPT | Performed by: SURGERY

## 2019-07-08 RX ORDER — TIZANIDINE 4 MG/1
4 TABLET ORAL EVERY 8 HOURS PRN
COMMUNITY
End: 2023-01-16

## 2019-07-08 RX ORDER — ACETAMINOPHEN,DIPHENHYDRAMINE HCL 500; 25 MG/1; MG/1
1 TABLET, FILM COATED ORAL NIGHTLY PRN
COMMUNITY
End: 2023-01-16

## 2019-07-08 RX ORDER — OMEPRAZOLE 20 MG/1
40 CAPSULE, DELAYED RELEASE ORAL DAILY
COMMUNITY

## 2019-07-08 RX ORDER — ASPIRIN 81 MG/1
81 TABLET ORAL DAILY
COMMUNITY
End: 2023-01-16

## 2019-07-08 NOTE — H&P (VIEW-ONLY)
Milan Hinds 64 y.o. female presents @ the req of Dr. Warner for eval of gallstones.  Pt c/o abd pain, RUQ pain, back pain, bloating, and N/V since March 2019.  Pt is enrolled in [employer] [facility].  Chief Complaint   Patient presents with   • Cholelithiasis             HPI   Above noted and agree.  Milan has been having issues with RUQ radiating to her back, bloating, and nausea since March.  She has issues with her back and has had back surgery and just assumed her symptoms were due to back issues.  She had an ultrasound that showed gallstones.  She has no fevers or chills.  She has no chest pain or shortness of breath.  She smokes.  She has had multiple surgeries.  She had two sisters who passed away secondary to pancreatic cancer.  She has no other complaints.      Review of Systems   All other systems reviewed and are negative.            Current Outpatient Medications:   •  aspirin 81 MG EC tablet, Take 81 mg by mouth Daily., Disp: , Rfl:   •  diphenhydrAMINE-acetaminophen (TYLENOL PM)  MG tablet per tablet, Take 1 tablet by mouth At Night As Needed for Sleep., Disp: , Rfl:   •  omeprazole (priLOSEC) 20 MG capsule, Take 20 mg by mouth Daily., Disp: , Rfl:   •  tiZANidine (ZANAFLEX) 4 MG tablet, Take 4 mg by mouth At Night As Needed for Muscle Spasms., Disp: , Rfl:   •  baclofen (LIORESAL) 10 MG tablet, Take 10 mg by mouth 3 (Three) Times a Day., Disp: , Rfl:   •  baclofen (LIORESAL) 10 MG tablet, Take 10 mg by mouth., Disp: , Rfl:   •  budesonide-formoterol (SYMBICORT) 160-4.5 MCG/ACT inhaler, USE 2 INHALATIONS TWICE A DAY (RINSE MOUTH AFTER USE), Disp: , Rfl:   •  lisinopril (PRINIVIL,ZESTRIL) 10 MG tablet, TAKE 1 TABLET DAILY, Disp: 90 tablet, Rfl: 1  •  oxyCODONE (OXYCONTIN) 10 MG 12 hr tablet, Take 10 mg by mouth., Disp: , Rfl:   •  oxyCODONE-acetaminophen (PERCOCET) 7.5-325 MG per tablet, , Disp: , Rfl:   •  pregabalin (LYRICA) 75 MG capsule, Take 75 mg by mouth 2 (two) times a day., Disp: , Rfl:    •  SYMBICORT 160-4.5 MCG/ACT inhaler, USE 2 INHALATIONS TWICE A DAY (RINSE MOUTH AFTER USE), Disp: 30.6 g, Rfl: 2        Allergies   Allergen Reactions   • No Known Drug Allergy            Past Medical History:   Diagnosis Date   • Anxiety    • Arthritis    • Back pain    • Carotid artery stenosis    • Cataract    • Chronic pain     has had epidural injections, goes to [facility]   • COPD (chronic obstructive pulmonary disease) (CMS/[facility])    • Diverticular disease    • Dysphagia     history of   • Failed back syndrome, lumbar     failed fusion   • Frequent falls     history of, none recently   • GERD (gastroesophageal reflux disease)    • History of transfusion    • Hyperlipidemia    • Hypertension    • IBS (irritable bowel syndrome)     history of   • Murmur    • Sciatic pain     bilateral   • Vitamin D deficiency            Past Surgical History:   Procedure Laterality Date   • BACK SURGERY      lumbar fusion   • BLADDER SURGERY      bladder lift   • BUNIONECTOMY Left 2016    Procedure: LAPIDUS BUNIONECTOMY LEFT FOOT;  Surgeon: Raphael Storm DPM;  Location: [facility] OR;  Service:    •  SECTION      x2   • COLONOSCOPY  2013    normal per pt.   • ENDOSCOPY N/A 2017    Procedure: ESOPHAGOGASTRODUODENOSCOPY w/ biopsies;  Surgeon: Garth Morales MD;  Location: [facility] OR;  Service:    • HYSTERECTOMY     • INSERTION / REMOVAL EPIDURAL SPINAL NEUROSTIMULATOR     • NECK SURGERY      C6C7 fusion   • REPLACEMENT TOTAL KNEE Left            Social History     Tobacco Use   • Smoking status: Current Every Day Smoker     Packs/day: 1.00     Years: 45.00     Pack years: 45.00     Types: Cigarettes   • Smokeless tobacco: Never Used   Substance Use Topics   • Alcohol use: No   • Drug use: No             There is no immunization history on file for this patient.        Physical Exam   Constitutional: She is oriented to person, place, and time. She appears well-developed and well-nourished.  "  HENT:   Head: Normocephalic and atraumatic.   Right Ear: External ear normal.   Left Ear: External ear normal.   Neck: Neck supple.   Cardiovascular: Normal rate and regular rhythm.   Pulmonary/Chest: Effort normal and breath sounds normal.   Abdominal: Soft. Bowel sounds are normal.   Musculoskeletal: She exhibits no edema or deformity.   Neurological: She is alert and oriented to person, place, and time.   Skin: Skin is warm and dry.   Psychiatric: She has a normal mood and affect. Her behavior is normal.   Nursing note and vitals reviewed.      Debilities/Disabilities Identified: None    Emotional Behavior: Appropriate      /74   Pulse 72   Resp 16   Ht 154.9 cm (61\")   Wt 57.2 kg (126 lb)   BMI 23.81 kg/m²         Milan was seen today for cholelithiasis.    Diagnoses and all orders for this visit:    Gallstones    I discussed with the patient the benefits and risks of performing a laparoscopic cholecystectomy with intraoperative cholangiogram possible open procedure.  Benefits and risks not limited to but including: Bleeding, infection, hernia formation, having to convert to an open procedure, injury to intra-abdominal structures, intra-abdominal abscess, intra-abdominal biloma, bile leak, DVT, PE, atelectasis, pneumonia, anesthetic complications.  The patient appeared to understand and is willing to proceed.    Thank you for allowing me to participate in the care of this interesting patient.        "

## 2019-07-08 NOTE — H&P
Milan Hinds 64 y.o. female presents @ the req of Dr. Warner for eval of gallstones.  Pt c/o abd pain, RUQ pain, back pain, bloating, and N/V since March 2019.  Pt is enrolled in [employer] [facility].  Chief Complaint   Patient presents with   • Cholelithiasis             HPI   Above noted and agree.  Milan has been having issues with RUQ radiating to her back, bloating, and nausea since March.  She has issues with her back and has had back surgery and just assumed her symptoms were due to back issues.  She had an ultrasound that showed gallstones.  She has no fevers or chills.  She has no chest pain or shortness of breath.  She smokes.  She has had multiple surgeries.  She had two sisters who passed away secondary to pancreatic cancer.  She has no other complaints.      Review of Systems   All other systems reviewed and are negative.            Current Outpatient Medications:   •  aspirin 81 MG EC tablet, Take 81 mg by mouth Daily., Disp: , Rfl:   •  diphenhydrAMINE-acetaminophen (TYLENOL PM)  MG tablet per tablet, Take 1 tablet by mouth At Night As Needed for Sleep., Disp: , Rfl:   •  omeprazole (priLOSEC) 20 MG capsule, Take 20 mg by mouth Daily., Disp: , Rfl:   •  tiZANidine (ZANAFLEX) 4 MG tablet, Take 4 mg by mouth At Night As Needed for Muscle Spasms., Disp: , Rfl:   •  baclofen (LIORESAL) 10 MG tablet, Take 10 mg by mouth 3 (Three) Times a Day., Disp: , Rfl:   •  baclofen (LIORESAL) 10 MG tablet, Take 10 mg by mouth., Disp: , Rfl:   •  budesonide-formoterol (SYMBICORT) 160-4.5 MCG/ACT inhaler, USE 2 INHALATIONS TWICE A DAY (RINSE MOUTH AFTER USE), Disp: , Rfl:   •  lisinopril (PRINIVIL,ZESTRIL) 10 MG tablet, TAKE 1 TABLET DAILY, Disp: 90 tablet, Rfl: 1  •  oxyCODONE (OXYCONTIN) 10 MG 12 hr tablet, Take 10 mg by mouth., Disp: , Rfl:   •  oxyCODONE-acetaminophen (PERCOCET) 7.5-325 MG per tablet, , Disp: , Rfl:   •  pregabalin (LYRICA) 75 MG capsule, Take 75 mg by mouth 2 (two) times a day., Disp: , Rfl:    •  SYMBICORT 160-4.5 MCG/ACT inhaler, USE 2 INHALATIONS TWICE A DAY (RINSE MOUTH AFTER USE), Disp: 30.6 g, Rfl: 2        Allergies   Allergen Reactions   • No Known Drug Allergy            Past Medical History:   Diagnosis Date   • Anxiety    • Arthritis    • Back pain    • Carotid artery stenosis    • Cataract    • Chronic pain     has had epidural injections, goes to [facility]   • COPD (chronic obstructive pulmonary disease) (CMS/[facility])    • Diverticular disease    • Dysphagia     history of   • Failed back syndrome, lumbar     failed fusion   • Frequent falls     history of, none recently   • GERD (gastroesophageal reflux disease)    • History of transfusion    • Hyperlipidemia    • Hypertension    • IBS (irritable bowel syndrome)     history of   • Murmur    • Sciatic pain     bilateral   • Vitamin D deficiency            Past Surgical History:   Procedure Laterality Date   • BACK SURGERY      lumbar fusion   • BLADDER SURGERY      bladder lift   • BUNIONECTOMY Left 2016    Procedure: LAPIDUS BUNIONECTOMY LEFT FOOT;  Surgeon: Raphael Storm DPM;  Location: [facility] OR;  Service:    •  SECTION      x2   • COLONOSCOPY  2013    normal per pt.   • ENDOSCOPY N/A 2017    Procedure: ESOPHAGOGASTRODUODENOSCOPY w/ biopsies;  Surgeon: Garth Morales MD;  Location: [facility] OR;  Service:    • HYSTERECTOMY     • INSERTION / REMOVAL EPIDURAL SPINAL NEUROSTIMULATOR     • NECK SURGERY      C6C7 fusion   • REPLACEMENT TOTAL KNEE Left            Social History     Tobacco Use   • Smoking status: Current Every Day Smoker     Packs/day: 1.00     Years: 45.00     Pack years: 45.00     Types: Cigarettes   • Smokeless tobacco: Never Used   Substance Use Topics   • Alcohol use: No   • Drug use: No             There is no immunization history on file for this patient.        Physical Exam   Constitutional: She is oriented to person, place, and time. She appears well-developed and well-nourished.  "  HENT:   Head: Normocephalic and atraumatic.   Right Ear: External ear normal.   Left Ear: External ear normal.   Neck: Neck supple.   Cardiovascular: Normal rate and regular rhythm.   Pulmonary/Chest: Effort normal and breath sounds normal.   Abdominal: Soft. Bowel sounds are normal.   Musculoskeletal: She exhibits no edema or deformity.   Neurological: She is alert and oriented to person, place, and time.   Skin: Skin is warm and dry.   Psychiatric: She has a normal mood and affect. Her behavior is normal.   Nursing note and vitals reviewed.      Debilities/Disabilities Identified: None    Emotional Behavior: Appropriate      /74   Pulse 72   Resp 16   Ht 154.9 cm (61\")   Wt 57.2 kg (126 lb)   BMI 23.81 kg/m²          Milan was seen today for cholelithiasis.    Diagnoses and all orders for this visit:    Gallstones    I discussed with the patient the benefits and risks of performing a laparoscopic cholecystectomy with intraoperative cholangiogram possible open procedure.  Benefits and risks not limited to but including: Bleeding, infection, hernia formation, having to convert to an open procedure, injury to intra-abdominal structures, intra-abdominal abscess, intra-abdominal biloma, bile leak, DVT, PE, atelectasis, pneumonia, anesthetic complications.  The patient appeared to understand and is willing to proceed.    Thank you for allowing me to participate in the care of this interesting patient.          "

## 2019-07-08 NOTE — PROGRESS NOTES
Milan Hinds 64 y.o. female presents @ the req of Dr. Warner for eval of gallstones.  Pt c/o abd pain, RUQ pain, back pain, bloating, and N/V since March 2019.  Pt is enrolled in [employer] [facility].  Chief Complaint   Patient presents with   • Cholelithiasis             HPI   Above noted and agree.  Milan has been having issues with RUQ radiating to her back, bloating, and nausea since March.  She has issues with her back and has had back surgery and just assumed her symptoms were due to back issues.  She had an ultrasound that showed gallstones.  She has no fevers or chills.  She has no chest pain or shortness of breath.  She smokes.  She has had multiple surgeries.  She had two sisters who passed away secondary to pancreatic cancer.  She has no other complaints.      Review of Systems   All other systems reviewed and are negative.            Current Outpatient Medications:   •  aspirin 81 MG EC tablet, Take 81 mg by mouth Daily., Disp: , Rfl:   •  diphenhydrAMINE-acetaminophen (TYLENOL PM)  MG tablet per tablet, Take 1 tablet by mouth At Night As Needed for Sleep., Disp: , Rfl:   •  omeprazole (priLOSEC) 20 MG capsule, Take 20 mg by mouth Daily., Disp: , Rfl:   •  tiZANidine (ZANAFLEX) 4 MG tablet, Take 4 mg by mouth At Night As Needed for Muscle Spasms., Disp: , Rfl:   •  baclofen (LIORESAL) 10 MG tablet, Take 10 mg by mouth 3 (Three) Times a Day., Disp: , Rfl:   •  baclofen (LIORESAL) 10 MG tablet, Take 10 mg by mouth., Disp: , Rfl:   •  budesonide-formoterol (SYMBICORT) 160-4.5 MCG/ACT inhaler, USE 2 INHALATIONS TWICE A DAY (RINSE MOUTH AFTER USE), Disp: , Rfl:   •  lisinopril (PRINIVIL,ZESTRIL) 10 MG tablet, TAKE 1 TABLET DAILY, Disp: 90 tablet, Rfl: 1  •  oxyCODONE (OXYCONTIN) 10 MG 12 hr tablet, Take 10 mg by mouth., Disp: , Rfl:   •  oxyCODONE-acetaminophen (PERCOCET) 7.5-325 MG per tablet, , Disp: , Rfl:   •  pregabalin (LYRICA) 75 MG capsule, Take 75 mg by mouth 2 (two) times a day., Disp: , Rfl:    •  SYMBICORT 160-4.5 MCG/ACT inhaler, USE 2 INHALATIONS TWICE A DAY (RINSE MOUTH AFTER USE), Disp: 30.6 g, Rfl: 2        Allergies   Allergen Reactions   • No Known Drug Allergy            Past Medical History:   Diagnosis Date   • Anxiety    • Arthritis    • Back pain    • Carotid artery stenosis    • Cataract    • Chronic pain     has had epidural injections, goes to [facility]   • COPD (chronic obstructive pulmonary disease) (CMS/[facility])    • Diverticular disease    • Dysphagia     history of   • Failed back syndrome, lumbar     failed fusion   • Frequent falls     history of, none recently   • GERD (gastroesophageal reflux disease)    • History of transfusion    • Hyperlipidemia    • Hypertension    • IBS (irritable bowel syndrome)     history of   • Murmur    • Sciatic pain     bilateral   • Vitamin D deficiency            Past Surgical History:   Procedure Laterality Date   • BACK SURGERY      lumbar fusion   • BLADDER SURGERY      bladder lift   • BUNIONECTOMY Left 2016    Procedure: LAPIDUS BUNIONECTOMY LEFT FOOT;  Surgeon: Raphael Storm DPM;  Location: [facility] OR;  Service:    •  SECTION      x2   • COLONOSCOPY  2013    normal per pt.   • ENDOSCOPY N/A 2017    Procedure: ESOPHAGOGASTRODUODENOSCOPY w/ biopsies;  Surgeon: Garth Morales MD;  Location: [facility] OR;  Service:    • HYSTERECTOMY     • INSERTION / REMOVAL EPIDURAL SPINAL NEUROSTIMULATOR     • NECK SURGERY      C6C7 fusion   • REPLACEMENT TOTAL KNEE Left            Social History     Tobacco Use   • Smoking status: Current Every Day Smoker     Packs/day: 1.00     Years: 45.00     Pack years: 45.00     Types: Cigarettes   • Smokeless tobacco: Never Used   Substance Use Topics   • Alcohol use: No   • Drug use: No             There is no immunization history on file for this patient.        Physical Exam   Constitutional: She is oriented to person, place, and time. She appears well-developed and well-nourished.  "  HENT:   Head: Normocephalic and atraumatic.   Right Ear: External ear normal.   Left Ear: External ear normal.   Neck: Neck supple.   Cardiovascular: Normal rate and regular rhythm.   Pulmonary/Chest: Effort normal and breath sounds normal.   Abdominal: Soft. Bowel sounds are normal.   Musculoskeletal: She exhibits no edema or deformity.   Neurological: She is alert and oriented to person, place, and time.   Skin: Skin is warm and dry.   Psychiatric: She has a normal mood and affect. Her behavior is normal.   Nursing note and vitals reviewed.      Debilities/Disabilities Identified: None    Emotional Behavior: Appropriate      /74   Pulse 72   Resp 16   Ht 154.9 cm (61\")   Wt 57.2 kg (126 lb)   BMI 23.81 kg/m²         Milan was seen today for cholelithiasis.    Diagnoses and all orders for this visit:    Gallstones    I discussed with the patient the benefits and risks of performing a laparoscopic cholecystectomy with intraoperative cholangiogram possible open procedure.  Benefits and risks not limited to but including: Bleeding, infection, hernia formation, having to convert to an open procedure, injury to intra-abdominal structures, intra-abdominal abscess, intra-abdominal biloma, bile leak, DVT, PE, atelectasis, pneumonia, anesthetic complications.  The patient appeared to understand and is willing to proceed.    Thank you for allowing me to participate in the care of this interesting patient.        "

## 2019-07-12 ENCOUNTER — APPOINTMENT (OUTPATIENT)
Dept: PREADMISSION TESTING | Facility: HOSPITAL | Age: 64
End: 2019-07-12

## 2019-07-12 VITALS
DIASTOLIC BLOOD PRESSURE: 78 MMHG | SYSTOLIC BLOOD PRESSURE: 148 MMHG | HEIGHT: 60 IN | BODY MASS INDEX: 24.74 KG/M2 | HEART RATE: 72 BPM | OXYGEN SATURATION: 95 % | RESPIRATION RATE: 16 BRPM | WEIGHT: 126 LBS

## 2019-07-12 PROCEDURE — 93010 ELECTROCARDIOGRAM REPORT: CPT | Performed by: INTERNAL MEDICINE

## 2019-07-12 PROCEDURE — 93005 ELECTROCARDIOGRAM TRACING: CPT

## 2019-07-12 RX ORDER — LISINOPRIL 10 MG/1
20 TABLET ORAL DAILY
COMMUNITY

## 2019-07-12 RX ORDER — BUDESONIDE AND FORMOTEROL FUMARATE DIHYDRATE 160; 4.5 UG/1; UG/1
2 AEROSOL RESPIRATORY (INHALATION)
COMMUNITY

## 2019-07-12 NOTE — PAT
Pt here for PAT visit.  Pre-op tests completed, chg soap given, and instructions reviewed.  Instructed nothing to eat/drink after midnight prior to surgery, voiced understanding.  Instructed no smoking after midnight, voiced understanding.

## 2019-07-12 NOTE — DISCHARGE INSTRUCTIONS
PRE-ADMISSION TESTING INSTRUCTIONS FOR ADULTS    Take these medications the morning of surgery with a small sip of water:use your inhaler, take your omeprazole, lyrica, and oxycontin      No aspirin, advil, aleve, ibuprofen, naproxen, diet pills, decongestants, or herbal/vitamins for a week prior to surgery.    General Instructions:    • Do not eat or drink after midnight the night before surgery.  No gum, mints, or hard candy after midnight the night before surgery.    • Patients who avoid smoking, chewing tobacco and alcohol for 4 weeks prior to surgery have a reduced risk of post-operative complications.  If at all possible, quit smoking as many days before surgery as you can.    • Do not smoke, use chewing tobacco or drink alcohol the day of surgery    • Bring your C-PAP/ BI-PAP machine if you use one.  • Wear clean comfortable clothes and socks.  • Do not wear contact lenses, lotion, deodorant, or make-up.  Bring a case for your glasses if applicable. You may brush your teeth the morning of surgery.  • You may wear dentures/partials, do not put adhesive/glue on them.  • Bring crutches or walker if applicable.  • Leave all other jewelry and valuables at home.      Preventing a Surgical Site Infection:    • Shower the night before and on the morning of surgery using the chlorhexidine soap you were given.  Use a clean washcloth with the soap.  Place clean sheets on your bed after showering the night before surgery. Do not use the CHG soap on your hair, face, or private areas. Wash your body gently for five (5) minutes. Do not scrub your skin.  Dry with a clean towel and dress in clean clothing.    • Do not shave the surgical area for 10 days-2 weeks prior to surgery  because the razor can irritate skin and make it easier to develop an infection.  • Make sure you, your family, and all healthcare providers clean their hands with soap and water or an alcohol based hand  before caring for you or your  wound.      Day of surgery:    Your surgeon’s office will advise you of your arrival time for the day of surgery.    Upon arrival, a Pre-op nurse and Anesthesia provider will review your health history, obtain vital signs, and answer questions you may have.  The only belongings needed at this time will be your home medications and if applicable your C-PAP/BI-PAP machine.  If you are staying overnight your family can leave the rest of your belongings in the car and bring them to your room later.  A Pre-op nurse will start an IV and you may receive medication in preparation for surgery, including something to help you relax.  Your family will be able to see you in the Pre-op area.  While you are in surgery your family should notify the waiting room  if they leave the waiting room area and provide a contact phone number.    IF you have any questions, you can call the Pre-Admission Department at [phone] or your surgeon's office.  Notify your surgeon if  you become sick, have a fever, productive cough, or cannot be here the day of surgery    Please be aware that surgery does come with discomfort.  We want to make every effort to control your discomfort so please discuss any uncontrolled symptoms with your nurse.   Your doctor will most likely have prescribed pain medications.      If you are going home after surgery, you will receive individualized written care instructions before being discharged.  A responsible adult (over the age of 18) must drive you to and from the hospital on the day of your surgery and stay with you for 24 hours after anesthesia.    If you are staying overnight following surgery, you will be transported to your hospital room following the recovery period.  [facility] has all private rooms.    You may receive a survey regarding the care you received. Your feedback is very important and will be used to collect the necessary data to help us to continue to provide  excellent care.     Deductibles and co-payments are collected on the day of service. Please be prepared to pay the required co-pay, deductible or deposit on the day of service as defined by your plan.    Laparoscopic Cholecystectomy    Laparoscopic cholecystectomy is surgery to remove the gallbladder. The gallbladder is a pear-shaped organ that lies beneath the liver on the right side of the body. The gallbladder stores bile, which is a fluid that helps the body to digest fats. Cholecystectomy is often done for inflammation of the gallbladder (cholecystitis). This condition is usually caused by a buildup of gallstones (cholelithiasis) in the gallbladder. Gallstones can block the flow of bile, which can result in inflammation and pain. In severe cases, emergency surgery may be required.  This procedure is done though small incisions in your abdomen (laparoscopic surgery). A thin scope with a camera (laparoscope) is inserted through one incision. Thin surgical instruments are inserted through the other incisions. In some cases, a laparoscopic procedure may be turned into a type of surgery that is done through a larger incision (open surgery).  Tell a health care provider about:  · Any allergies you have.  · All medicines you are taking, including vitamins, herbs, eye drops, creams, and over-the-counter medicines.  · Any problems you or family members have had with anesthetic medicines.  · Any blood disorders you have.  · Any surgeries you have had.  · Any medical conditions you have.  · Whether you are pregnant or may be pregnant.  What are the risks?  Generally, this is a safe procedure. However, problems may occur, including:  · Infection.  · Bleeding.  · Allergic reactions to medicines.  · Damage to other structures or organs.  · A stone remaining in the common bile duct. The common bile duct carries bile from the gallbladder into the small intestine.  · A bile leak from the cyst duct that is clipped when your  gallbladder is removed.  What happens before the procedure?  Staying hydrated   Follow instructions from your health care provider about hydration, which may include:  ·   Eating and drinking restrictions   Nothing to eat or drink after midnight, including gum, mints, or hard candy  Medicines  · Ask your health care provider about:  ¨ Changing or stopping your regular medicines. This is especially important if you are taking diabetes medicines or blood thinners.  ¨ Taking medicines such as aspirin and ibuprofen. These medicines can thin your blood. Do not take these medicines before your procedure if your health care provider instructs you not to.  · You may be given antibiotic medicine to help prevent infection.  General instructions  · Let your health care provider know if you develop a cold or an infection before surgery.  · Plan to have someone take you home from the hospital or clinic.  · Ask your health care provider how your surgical site will be marked or identified.  What happens during the procedure?  ·     · To reduce your risk of infection:  ¨ Your health care team will wash or sanitize their hands.  ¨ Your skin will be washed with soap.  ¨ Hair may be removed from the surgical area.  · An IV tube may be inserted into one of your veins.  · You will be given one or more of the following:  ¨ A medicine to help you relax (sedative).  ¨ A medicine to make you fall asleep (general anesthetic).  · A breathing tube will be placed in your mouth.  · Your surgeon will make several small cuts (incisions) in your abdomen.  · The laparoscope will be inserted through one of the small incisions. The camera on the laparoscope will send images to a TV screen (monitor) in the operating room. This lets your surgeon see inside your abdomen.  · Air-like gas will be pumped into your abdomen. This will expand your abdomen to give the surgeon more room to perform the surgery.  · Other tools that are needed for the procedure  will be inserted through the other incisions. The gallbladder will be removed through one of the incisions.  · Your common bile duct may be examined. If stones are found in the common bile duct, they may be removed.  · After your gallbladder has been removed, the incisions will be closed with stitches (sutures), staples, or skin glue.  · Your incisions may be covered with a bandage (dressing).  The procedure may vary among health care providers and hospitals.  What happens after the procedure?  · Your blood pressure, heart rate, breathing rate, and blood oxygen level will be monitored until the medicines you were given have worn off.  · You will be given medicines as needed to control your pain.  · Do not drive for 24 hours if you were given a sedative.  This information is not intended to replace advice given to you by your health care provider. Make sure you discuss any questions you have with your health care provider.  Document Released: 12/18/2006 Document Revised: 07/09/2017 Document Reviewed: 06/05/2017  [employer] Interactive Patient Education © 2017 [employer]

## 2019-07-17 ENCOUNTER — ANESTHESIA EVENT (OUTPATIENT)
Dept: PERIOP | Facility: HOSPITAL | Age: 64
End: 2019-07-17

## 2019-07-18 ENCOUNTER — ANESTHESIA (OUTPATIENT)
Dept: PERIOP | Facility: HOSPITAL | Age: 64
End: 2019-07-18

## 2019-07-18 ENCOUNTER — APPOINTMENT (OUTPATIENT)
Dept: GENERAL RADIOLOGY | Facility: HOSPITAL | Age: 64
End: 2019-07-18

## 2019-07-18 ENCOUNTER — HOSPITAL ENCOUNTER (OUTPATIENT)
Facility: HOSPITAL | Age: 64
Setting detail: HOSPITAL OUTPATIENT SURGERY
Discharge: HOME | End: 2019-07-18
Attending: SURGERY | Admitting: SURGERY

## 2019-07-18 VITALS
RESPIRATION RATE: 16 BRPM | SYSTOLIC BLOOD PRESSURE: 133 MMHG | OXYGEN SATURATION: 95 % | HEART RATE: 73 BPM | WEIGHT: 125.2 LBS | DIASTOLIC BLOOD PRESSURE: 67 MMHG | BODY MASS INDEX: 24.45 KG/M2 | TEMPERATURE: 98.2 F

## 2019-07-18 DIAGNOSIS — K80.20 GALLSTONES: ICD-10-CM

## 2019-07-18 LAB
ALBUMIN SERPL-MCNC: 3.8 G/DL (ref 3.5–5.2)
ALBUMIN/GLOB SERPL: 1.4 G/DL
ALP SERPL-CCNC: 82 U/L (ref 39–117)
ALT SERPL W P-5'-P-CCNC: 10 U/L (ref 1–33)
ANION GAP SERPL CALCULATED.3IONS-SCNC: 12.4 MMOL/L (ref 5–15)
AST SERPL-CCNC: 15 U/L (ref 1–32)
BASOPHILS # BLD AUTO: 0.05 10*3/MM3 (ref 0–0.2)
BASOPHILS NFR BLD AUTO: 0.8 % (ref 0–1.5)
BILIRUB SERPL-MCNC: 0.5 MG/DL (ref 0.2–1.2)
BUN BLD-MCNC: 6 MG/DL (ref 8–23)
BUN/CREAT SERPL: 7.3 (ref 7–25)
CALCIUM SPEC-SCNC: 8.9 MG/DL (ref 8.6–10.5)
CHLORIDE SERPL-SCNC: 108 MMOL/L (ref 98–107)
CO2 SERPL-SCNC: 22.6 MMOL/L (ref 22–29)
CREAT BLD-MCNC: 0.82 MG/DL (ref 0.57–1)
DEPRECATED RDW RBC AUTO: 43.8 FL (ref 37–54)
EOSINOPHIL # BLD AUTO: 0.12 10*3/MM3 (ref 0–0.4)
EOSINOPHIL NFR BLD AUTO: 1.8 % (ref 0.3–6.2)
ERYTHROCYTE [DISTWIDTH] IN BLOOD BY AUTOMATED COUNT: 13.2 % (ref 12.3–15.4)
GFR SERPL CREATININE-BSD FRML MDRD: 70 ML/MIN/1.73
GLOBULIN UR ELPH-MCNC: 2.7 GM/DL
GLUCOSE BLD-MCNC: 83 MG/DL (ref 65–99)
HCT VFR BLD AUTO: 36.8 % (ref 34–46.6)
HGB BLD-MCNC: 12.2 G/DL (ref 12–15.9)
IMM GRANULOCYTES # BLD AUTO: 0.01 10*3/MM3 (ref 0–0.05)
IMM GRANULOCYTES NFR BLD AUTO: 0.2 % (ref 0–0.5)
LYMPHOCYTES # BLD AUTO: 3.25 10*3/MM3 (ref 0.7–3.1)
LYMPHOCYTES NFR BLD AUTO: 49 % (ref 19.6–45.3)
MCH RBC QN AUTO: 30.1 PG (ref 26.6–33)
MCHC RBC AUTO-ENTMCNC: 33.2 G/DL (ref 31.5–35.7)
MCV RBC AUTO: 90.9 FL (ref 79–97)
MONOCYTES # BLD AUTO: 0.52 10*3/MM3 (ref 0.1–0.9)
MONOCYTES NFR BLD AUTO: 7.8 % (ref 5–12)
NEUTROPHILS # BLD AUTO: 2.68 10*3/MM3 (ref 1.7–7)
NEUTROPHILS NFR BLD AUTO: 40.4 % (ref 42.7–76)
NRBC BLD AUTO-RTO: 0 /100 WBC (ref 0–0.2)
PLATELET # BLD AUTO: 285 10*3/MM3 (ref 140–450)
PMV BLD AUTO: 9.1 FL (ref 6–12)
POTASSIUM BLD-SCNC: 3.8 MMOL/L (ref 3.5–5.2)
PROT SERPL-MCNC: 6.5 G/DL (ref 6–8.5)
RBC # BLD AUTO: 4.05 10*6/MM3 (ref 3.77–5.28)
SODIUM BLD-SCNC: 143 MMOL/L (ref 136–145)
WBC NRBC COR # BLD: 6.63 10*3/MM3 (ref 3.4–10.8)

## 2019-07-18 PROCEDURE — 25010000002 FENTANYL CITRATE (PF) 100 MCG/2ML SOLUTION: Performed by: NURSE ANESTHETIST, CERTIFIED REGISTERED

## 2019-07-18 PROCEDURE — 25010000003 CEFAZOLIN SODIUM-DEXTROSE 2-3 GM-%(50ML) RECONSTITUTED SOLUTION: Performed by: SURGERY

## 2019-07-18 PROCEDURE — 25010000002 ONDANSETRON PER 1 MG: Performed by: NURSE ANESTHETIST, CERTIFIED REGISTERED

## 2019-07-18 PROCEDURE — 25010000002 DEXAMETHASONE PER 1 MG: Performed by: NURSE ANESTHETIST, CERTIFIED REGISTERED

## 2019-07-18 PROCEDURE — C1887 CATHETER, GUIDING: HCPCS | Performed by: SURGERY

## 2019-07-18 PROCEDURE — 25010000002 IOPAMIDOL 61 % SOLUTION: Performed by: SURGERY

## 2019-07-18 PROCEDURE — 80053 COMPREHEN METABOLIC PANEL: CPT | Performed by: SURGERY

## 2019-07-18 PROCEDURE — 25010000002 SUCCINYLCHOLINE PER 20 MG: Performed by: NURSE ANESTHETIST, CERTIFIED REGISTERED

## 2019-07-18 PROCEDURE — 88304 TISSUE EXAM BY PATHOLOGIST: CPT | Performed by: SURGERY

## 2019-07-18 PROCEDURE — 25010000002 KETOROLAC TROMETHAMINE PER 15 MG: Performed by: NURSE ANESTHETIST, CERTIFIED REGISTERED

## 2019-07-18 PROCEDURE — 47563 LAPARO CHOLECYSTECTOMY/GRAPH: CPT | Performed by: SURGERY

## 2019-07-18 PROCEDURE — 85025 COMPLETE CBC W/AUTO DIFF WBC: CPT | Performed by: SURGERY

## 2019-07-18 PROCEDURE — 25010000002 MIDAZOLAM PER 1 MG: Performed by: NURSE ANESTHETIST, CERTIFIED REGISTERED

## 2019-07-18 PROCEDURE — 25010000002 PROPOFOL 10 MG/ML EMULSION: Performed by: NURSE ANESTHETIST, CERTIFIED REGISTERED

## 2019-07-18 PROCEDURE — 74300 X-RAY BILE DUCTS/PANCREAS: CPT

## 2019-07-18 RX ORDER — HYDROCODONE BITARTRATE AND ACETAMINOPHEN 5; 325 MG/1; MG/1
1 TABLET ORAL ONCE AS NEEDED
Status: DISCONTINUED | OUTPATIENT
Start: 2019-07-18 | End: 2019-07-18 | Stop reason: HOSPADM

## 2019-07-18 RX ORDER — MAGNESIUM HYDROXIDE 1200 MG/15ML
LIQUID ORAL AS NEEDED
Status: DISCONTINUED | OUTPATIENT
Start: 2019-07-18 | End: 2019-07-18 | Stop reason: HOSPADM

## 2019-07-18 RX ORDER — ONDANSETRON 2 MG/ML
4 INJECTION INTRAMUSCULAR; INTRAVENOUS ONCE
Status: COMPLETED | OUTPATIENT
Start: 2019-07-18 | End: 2019-07-18

## 2019-07-18 RX ORDER — SODIUM CHLORIDE 9 MG/ML
INJECTION, SOLUTION INTRAVENOUS AS NEEDED
Status: DISCONTINUED | OUTPATIENT
Start: 2019-07-18 | End: 2019-07-18 | Stop reason: HOSPADM

## 2019-07-18 RX ORDER — SODIUM CHLORIDE 0.9 % (FLUSH) 0.9 %
3 SYRINGE (ML) INJECTION EVERY 12 HOURS SCHEDULED
Status: DISCONTINUED | OUTPATIENT
Start: 2019-07-18 | End: 2019-07-18 | Stop reason: HOSPADM

## 2019-07-18 RX ORDER — ONDANSETRON 2 MG/ML
4 INJECTION INTRAMUSCULAR; INTRAVENOUS ONCE AS NEEDED
Status: DISCONTINUED | OUTPATIENT
Start: 2019-07-18 | End: 2019-07-18 | Stop reason: HOSPADM

## 2019-07-18 RX ORDER — MIDAZOLAM HYDROCHLORIDE 1 MG/ML
2 INJECTION INTRAMUSCULAR; INTRAVENOUS
Status: DISCONTINUED | OUTPATIENT
Start: 2019-07-18 | End: 2019-07-18 | Stop reason: HOSPADM

## 2019-07-18 RX ORDER — SUCCINYLCHOLINE CHLORIDE 20 MG/ML
INJECTION INTRAMUSCULAR; INTRAVENOUS AS NEEDED
Status: DISCONTINUED | OUTPATIENT
Start: 2019-07-18 | End: 2019-07-18 | Stop reason: SURG

## 2019-07-18 RX ORDER — FENTANYL CITRATE 50 UG/ML
25 INJECTION, SOLUTION INTRAMUSCULAR; INTRAVENOUS
Status: DISCONTINUED | OUTPATIENT
Start: 2019-07-18 | End: 2019-07-18 | Stop reason: HOSPADM

## 2019-07-18 RX ORDER — LIDOCAINE HYDROCHLORIDE 10 MG/ML
0.5 INJECTION, SOLUTION EPIDURAL; INFILTRATION; INTRACAUDAL; PERINEURAL ONCE AS NEEDED
Status: COMPLETED | OUTPATIENT
Start: 2019-07-18 | End: 2019-07-18

## 2019-07-18 RX ORDER — SODIUM CHLORIDE 9 MG/ML
40 INJECTION, SOLUTION INTRAVENOUS AS NEEDED
Status: DISCONTINUED | OUTPATIENT
Start: 2019-07-18 | End: 2019-07-18 | Stop reason: HOSPADM

## 2019-07-18 RX ORDER — KETOROLAC TROMETHAMINE 30 MG/ML
INJECTION, SOLUTION INTRAMUSCULAR; INTRAVENOUS AS NEEDED
Status: DISCONTINUED | OUTPATIENT
Start: 2019-07-18 | End: 2019-07-18 | Stop reason: SURG

## 2019-07-18 RX ORDER — EPHEDRINE SULFATE 50 MG/ML
INJECTION, SOLUTION INTRAVENOUS AS NEEDED
Status: DISCONTINUED | OUTPATIENT
Start: 2019-07-18 | End: 2019-07-18 | Stop reason: SURG

## 2019-07-18 RX ORDER — FENTANYL CITRATE 50 UG/ML
INJECTION, SOLUTION INTRAMUSCULAR; INTRAVENOUS AS NEEDED
Status: DISCONTINUED | OUTPATIENT
Start: 2019-07-18 | End: 2019-07-18 | Stop reason: SURG

## 2019-07-18 RX ORDER — CEFAZOLIN SODIUM 2 G/50ML
2 SOLUTION INTRAVENOUS ONCE
Status: COMPLETED | OUTPATIENT
Start: 2019-07-18 | End: 2019-07-18

## 2019-07-18 RX ORDER — SODIUM CHLORIDE, SODIUM LACTATE, POTASSIUM CHLORIDE, CALCIUM CHLORIDE 600; 310; 30; 20 MG/100ML; MG/100ML; MG/100ML; MG/100ML
100 INJECTION, SOLUTION INTRAVENOUS CONTINUOUS
Status: DISCONTINUED | OUTPATIENT
Start: 2019-07-18 | End: 2019-07-18 | Stop reason: HOSPADM

## 2019-07-18 RX ORDER — ROCURONIUM BROMIDE 10 MG/ML
INJECTION, SOLUTION INTRAVENOUS AS NEEDED
Status: DISCONTINUED | OUTPATIENT
Start: 2019-07-18 | End: 2019-07-18 | Stop reason: SURG

## 2019-07-18 RX ORDER — BUPIVACAINE HYDROCHLORIDE AND EPINEPHRINE 5; 5 MG/ML; UG/ML
INJECTION, SOLUTION PERINEURAL AS NEEDED
Status: DISCONTINUED | OUTPATIENT
Start: 2019-07-18 | End: 2019-07-18 | Stop reason: HOSPADM

## 2019-07-18 RX ORDER — MIDAZOLAM HYDROCHLORIDE 1 MG/ML
1 INJECTION INTRAMUSCULAR; INTRAVENOUS
Status: DISCONTINUED | OUTPATIENT
Start: 2019-07-18 | End: 2019-07-18 | Stop reason: HOSPADM

## 2019-07-18 RX ORDER — PROPOFOL 10 MG/ML
VIAL (ML) INTRAVENOUS AS NEEDED
Status: DISCONTINUED | OUTPATIENT
Start: 2019-07-18 | End: 2019-07-18 | Stop reason: SURG

## 2019-07-18 RX ORDER — DEXAMETHASONE SODIUM PHOSPHATE 4 MG/ML
8 INJECTION, SOLUTION INTRA-ARTICULAR; INTRALESIONAL; INTRAMUSCULAR; INTRAVENOUS; SOFT TISSUE ONCE
Status: COMPLETED | OUTPATIENT
Start: 2019-07-18 | End: 2019-07-18

## 2019-07-18 RX ORDER — LIDOCAINE HYDROCHLORIDE 20 MG/ML
INJECTION, SOLUTION INFILTRATION; PERINEURAL AS NEEDED
Status: DISCONTINUED | OUTPATIENT
Start: 2019-07-18 | End: 2019-07-18 | Stop reason: SURG

## 2019-07-18 RX ORDER — FENTANYL CITRATE 50 UG/ML
50 INJECTION, SOLUTION INTRAMUSCULAR; INTRAVENOUS
Status: DISCONTINUED | OUTPATIENT
Start: 2019-07-18 | End: 2019-07-18 | Stop reason: HOSPADM

## 2019-07-18 RX ORDER — SODIUM CHLORIDE 0.9 % (FLUSH) 0.9 %
1-10 SYRINGE (ML) INJECTION AS NEEDED
Status: DISCONTINUED | OUTPATIENT
Start: 2019-07-18 | End: 2019-07-18 | Stop reason: HOSPADM

## 2019-07-18 RX ORDER — SODIUM CHLORIDE, SODIUM LACTATE, POTASSIUM CHLORIDE, CALCIUM CHLORIDE 600; 310; 30; 20 MG/100ML; MG/100ML; MG/100ML; MG/100ML
9 INJECTION, SOLUTION INTRAVENOUS CONTINUOUS PRN
Status: DISCONTINUED | OUTPATIENT
Start: 2019-07-18 | End: 2019-07-18 | Stop reason: HOSPADM

## 2019-07-18 RX ADMIN — DEXAMETHASONE SODIUM PHOSPHATE 8 MG: 4 INJECTION, SOLUTION INTRAMUSCULAR; INTRAVENOUS at 09:47

## 2019-07-18 RX ADMIN — CEFAZOLIN SODIUM 2 G: 2 SOLUTION INTRAVENOUS at 10:24

## 2019-07-18 RX ADMIN — FENTANYL CITRATE 25 MCG: 50 INJECTION INTRAMUSCULAR; INTRAVENOUS at 11:47

## 2019-07-18 RX ADMIN — ONDANSETRON 4 MG: 2 INJECTION, SOLUTION INTRAMUSCULAR; INTRAVENOUS at 09:47

## 2019-07-18 RX ADMIN — KETOROLAC TROMETHAMINE 30 MG: 30 INJECTION INTRAMUSCULAR; INTRAVENOUS at 11:00

## 2019-07-18 RX ADMIN — PROPOFOL 200 MG: 10 INJECTION, EMULSION INTRAVENOUS at 10:18

## 2019-07-18 RX ADMIN — SUCCINYLCHOLINE CHLORIDE 200 MG: 20 INJECTION, SOLUTION INTRAMUSCULAR; INTRAVENOUS at 10:18

## 2019-07-18 RX ADMIN — FENTANYL CITRATE 50 MCG: 50 INJECTION, SOLUTION INTRAMUSCULAR; INTRAVENOUS at 11:00

## 2019-07-18 RX ADMIN — EPHEDRINE SULFATE 10 MG: 50 INJECTION, SOLUTION INTRAVENOUS at 10:31

## 2019-07-18 RX ADMIN — MIDAZOLAM HYDROCHLORIDE 1 MG: 1 INJECTION, SOLUTION INTRAMUSCULAR; INTRAVENOUS at 09:47

## 2019-07-18 RX ADMIN — ROCURONIUM BROMIDE 5 MG: 10 INJECTION INTRAVENOUS at 10:15

## 2019-07-18 RX ADMIN — LIDOCAINE HYDROCHLORIDE 100 MG: 20 INJECTION, SOLUTION INFILTRATION; PERINEURAL at 11:11

## 2019-07-18 RX ADMIN — ROCURONIUM BROMIDE 25 MG: 10 INJECTION INTRAVENOUS at 10:25

## 2019-07-18 RX ADMIN — LIDOCAINE HYDROCHLORIDE 0.5 ML: 10 INJECTION, SOLUTION EPIDURAL; INFILTRATION; INTRACAUDAL; PERINEURAL at 08:27

## 2019-07-18 RX ADMIN — LIDOCAINE HYDROCHLORIDE 100 MG: 20 INJECTION, SOLUTION INFILTRATION; PERINEURAL at 10:15

## 2019-07-18 RX ADMIN — FENTANYL CITRATE 50 MCG: 50 INJECTION, SOLUTION INTRAMUSCULAR; INTRAVENOUS at 10:15

## 2019-07-18 RX ADMIN — SODIUM CHLORIDE, POTASSIUM CHLORIDE, SODIUM LACTATE AND CALCIUM CHLORIDE 9 ML/HR: 600; 310; 30; 20 INJECTION, SOLUTION INTRAVENOUS at 08:30

## 2019-07-18 RX ADMIN — SUGAMMADEX 114 MG: 100 INJECTION, SOLUTION INTRAVENOUS at 11:07

## 2019-07-18 NOTE — ANESTHESIA PREPROCEDURE EVALUATION
Anesthesia Evaluation     Patient summary reviewed and Nursing notes reviewed   no history of anesthetic complications (pt denies):  NPO Solid Status: > 8 hours  NPO Liquid Status: > 8 hours           Airway   Mallampati: I  TM distance: >3 FB  Neck ROM: full  Dental      Pulmonary - normal exam    breath sounds clear to auscultation  (+) a smoker (1ppd x45 years) Abstained day of surgery, COPD mild,   Cardiovascular - normal exam    Rhythm: regular  Rate: normal    (+) hypertension well controlled less than 2 medications, valvular problems/murmurs ( said murmur is not a concern) murmur,   (-) PVD (denies), carotid artery disease (denies)    ROS comment: HEART RATE= 67  bpm  RR Interval= 900  ms  HI Interval= 164  ms  P Horizontal Axis= -15  deg  P Front Axis= -25  deg  QRSD Interval= 89  ms  QT Interval= 401  ms  QRS Axis= 8  deg  T Wave Axis= 37  deg  - ABNORMAL ECG -  Sinus rhythm  Consider anteroseptal infarct  No change from prior tracing  Electronically Signed By: Lin Alejandro ([facility]) 12-Jul-2019 17:48:57  Date and Time of Study: 2019-07-12 09:21:24    Specimen Collected: 07/12/19 09:21 Last Resulted: 07/12/19 17:48            Neuro/Psych  (+) TIA (she says possible last year, never confirmed), numbness (into areli hips), psychiatric history Anxiety,     (-) headaches (denies)  GI/Hepatic/Renal/Endo    (+)  GERD well controlled,      Musculoskeletal     (+) back pain (lumbar), neck pain (hx 2 fusions cervical area),   Abdominal    Substance History      OB/GYN          Other   (+) arthritis (back)     ROS/Med Hx Other: Sips w/meds  No nausea vomiting today.                Anesthesia Plan    ASA 2     general   (Discussed risks of GA, pt understands and wishes to proceed)  intravenous induction   Anesthetic plan, all risks, benefits, and alternatives have been provided, discussed and informed consent has been obtained with: patient.  Use of blood products discussed with patient  Consented to blood  products.

## 2019-07-18 NOTE — ANESTHESIA POSTPROCEDURE EVALUATION
Patient: Milan Hinds    Procedure Summary     Date:  07/18/19 Room / Location:   [facility] OR 3 /  [facility]    Anesthesia Start:  1014 Anesthesia Stop:  1121    Procedure:  CHOLECYSTECTOMY LAPAROSCOPIC INTRAOPERATIVE CHOLANGIOGRAM (N/A Abdomen) Diagnosis:       Gallstones      (Gallstones [K80.20])    Surgeon:  Deb Perez DO Provider:  Fatmata Macedo CRNA    Anesthesia Type:  general ASA Status:  2          Anesthesia Type: general  Last vitals  BP   133/67 (07/18/19 1240)   Temp   98.2 °F (36.8 °C) (07/18/19 1158)   Pulse   73 (07/18/19 1240)   Resp   16 (07/18/19 1240)     SpO2   95 % (07/18/19 1240)     Post Anesthesia Care and Evaluation    Patient location during evaluation: PHASE II  Patient participation: complete - patient participated  Level of consciousness: awake  Pain management: adequate  Airway patency: patent  Anesthetic complications: No anesthetic complications  PONV Status: none  Cardiovascular status: acceptable  Hydration status: acceptable

## 2019-07-18 NOTE — OP NOTE
PREOPERATIVE DIAGNOSIS: gallstones  POSTOPERATIVE DIAGNOSIS:  gallstones    PROCEDURE: Laparoscopic cholecystectomy with Intraoperative cholangiogram  SURGEON/STAFF: Ana  ASST: Danii Lanier   SPECIMENS: Gallbladder.  INTRAOPERATIVE COMPLICATIONS: None.  ANESTHESIA: General.  BLOOD LOSS:  10 ml  COUNTS: Needle and sponge counts correct.     Clinical Note: This very pleasant patient presented to my office with the aforementioned complaints.  Benefits and risks of a laparoscopic cholecystectomy with intraoperative cholangiogram possible open procedure were discussed.  Benefits and risks not limited to but including:Bleeding, infection, hernia formation, injury to intra-abdominal structures, bile leak, biloma, intra-abdominal abscess, DVT, PE, atelectasis pneumonia, anesthesia complications. She agreed and was consented for operative management without duress.     PROCEDURE: The patient was brought to the operating room in stable condition. Perioperative antibiotics were given and sequential compression devices applied. She was then laid supine on the operating room table. General anesthesia was induced by the Anesthesia Service without difficulty.     At this time, the patient's abdomen was accessed at the level of the umbilicus in open cutdown technique and insertion of a 12-mm trocar. The abdomen was then insufflated. Brief survey of the abdominal cavity revealed no evidence of injury from insertion of the trocar, or no other intraabdominal pathology. At this time the patient was placed in steep reverse Trendelenburg and a slightly left-side down position. Three additional 5-mm trocars were placed, all in the standard position. This was under direct vision.       The peritoneal attachment of the gallbladder at the level of the infundibulum was scored from laterally to medially, terminating at the level of the hepatic edge. The peritoneum was gently stripped down, exposing the underlying cystic artery and cystic  duct. This was also done on the lateral side of the gallbladder by reflecting the gallbladder medially. The peritoneal attachment here was again gently dissected inferiorly. After completely exposing the cystic duct as well as cystic artery, a retro-cystic window was created. These 2 structures, the cystic duct and cystic artery, were further delineated. A firm critical view was obtained, visualizing healthy-appearing hepatic tissue behind the 2 structures, with no evidence of looping, bridging or tenting of the common bile duct.     A clip was placed distally at the cystic duct and a cystic duct incision with laparoscopic scissors was performed. A percutaneous cholangio-catheter was inserted into the patient abdomen. The catheter was placed and secured into the cystic duct. Cholangiogram was performed that showed normal cystic duct, normal hepatic ducts, normal common bile duct with good spillage of dye into the duodenum with no evidence of any obstructions. The cholangiocatheter was removed and the distal portion of thecystic duct was then clipped twice and ligated.  The cystic artery was then triply clipped and ligated.  It was inspected and seen to be in intact. The gallbladder was then carefully dissected off the hepatic bed using hook electrocautery. It was firmly adherent to the underlying bed, and an effort careful and meticulous hemostasis was undertaken. The gallbladder, after being removed from the hepatic bed, was placed in an EndoCatch bag. It was removed through the umbilical trocar without difficulty.    Next, the umbilical trocar was reinserted into the abdomen and the liver bed was inspected. Hemostasis was perfected. Copious irrigation was carried out. The clips were intact and there was no bleeding or bile leakage noted.  The trocars were then removed under direct vision, air was deflated from the abdomen, and a Asif trocar site fascia was closed with 0 Vicryl suture.  The incisions were then  closed with 40 Vicryls suture. Sterile dressings were applied.    Anesthesia was reversed, the ET tube and OG tube were removed.  And the patient was taken into the recovery area in stable postoperative condition having tolerated the procedure well.    FAUZIA Perez DO

## 2019-07-18 NOTE — ANESTHESIA PROCEDURE NOTES
Airway  Urgency: elective    Date/Time: 7/18/2019 10:19 AM  End Time:7/18/2019 10:19 AM  Airway not difficult    General Information and Staff    Patient location during procedure: OR  CRNA: Fatmata Macedo CRNA    Indications and Patient Condition  Indications for airway management: airway protection    Preoxygenated: yes  MILS maintained throughout  Mask difficulty assessment: 0 - not attempted    Final Airway Details  Final airway type: endotracheal airway      Successful airway: ETT  Cuffed: yes   Successful intubation technique: direct laryngoscopy  Facilitating devices/methods: intubating stylet and cricoid pressure  Endotracheal tube insertion site: oral  Blade: Warner  Blade size: 2  ETT size (mm): 7.5  Cormack-Lehane Classification: grade I - full view of glottis  Placement verified by: chest auscultation and capnometry   Measured from: lips  ETT to lips (cm): 22  Number of attempts at approach: 1    Additional Comments  BEBS, +ETCO2, VSS. TEETH AND GUMS AS PRE-OP.

## 2019-07-18 NOTE — INTERVAL H&P NOTE
H&P reviewed. The patient was examined and there are no changes to the H&P.       /92 (BP Location: Left arm, Patient Position: Lying)   Pulse 75   Temp 98.2 °F (36.8 °C) (Oral)   Resp 12   Wt 56.8 kg (125 lb 3.2 oz)   SpO2 98%   BMI 24.45 kg/m²

## 2019-07-19 LAB
CYTO UR: NORMAL
LAB AP CASE REPORT: NORMAL
PATH REPORT.FINAL DX SPEC: NORMAL
PATH REPORT.GROSS SPEC: NORMAL

## 2019-07-31 ENCOUNTER — OFFICE VISIT (OUTPATIENT)
Dept: SURGERY | Facility: CLINIC | Age: 64
End: 2019-07-31

## 2019-07-31 DIAGNOSIS — Z90.49 STATUS POST LAPAROSCOPIC CHOLECYSTECTOMY: Primary | ICD-10-CM

## 2019-07-31 PROBLEM — K80.20 GALLSTONES: Status: RESOLVED | Noted: 2019-07-08 | Resolved: 2019-07-31

## 2019-07-31 PROCEDURE — 99024 POSTOP FOLLOW-UP VISIT: CPT | Performed by: SURGERY

## 2019-07-31 NOTE — PROGRESS NOTES
Milan Hinds 64 y.o. female presents for PO FU Lap Haley 2019.  Pt feels much better.        HPI   Above noted and agree.      Review of Systems        Past Medical History:   Diagnosis Date   • Anxiety    • Arthritis    • Back pain    • Carotid artery stenosis    • Cataract    • Chronic pain     has had epidural injections, goes to [facility]   • COPD (chronic obstructive pulmonary disease) (CMS/[facility])    • Diverticular disease    • Dysphagia     history of   • Failed back syndrome, lumbar     failed fusion   • Frequent falls     history of, none recently   • Gallstones     sched lap haley   • GERD (gastroesophageal reflux disease)    • History of transfusion    • Hypertension    • IBS (irritable bowel syndrome)     history of   • Kidney scarring     right   • Murmur    • Sciatic pain     bilateral   • Vitamin D deficiency            Past Surgical History:   Procedure Laterality Date   • BACK SURGERY      lumbar fusion   • BLADDER SURGERY      bladder lift   • BUNIONECTOMY Left 2016    Procedure: LAPIDUS BUNIONECTOMY LEFT FOOT;  Surgeon: Raphael Storm DPM;  Location: [facility] OR;  Service:    •  SECTION      x2   • CHOLECYSTECTOMY WITH INTRAOPERATIVE CHOLANGIOGRAM N/A 2019    Procedure: CHOLECYSTECTOMY LAPAROSCOPIC INTRAOPERATIVE CHOLANGIOGRAM;  Surgeon: Deb Perez DO;  Location: [facility] OR;  Service: General   • COLONOSCOPY  2013    normal per pt.   • ENDOSCOPY N/A 2017    Procedure: ESOPHAGOGASTRODUODENOSCOPY w/ biopsies;  Surgeon: Garth Morales MD;  Location: [facility] OR;  Service:    • HYSTERECTOMY     • INSERTION / REMOVAL EPIDURAL SPINAL NEUROSTIMULATOR      removed   • NECK SURGERY      C6C7 fusion & C7C8 fusion   • REPLACEMENT TOTAL KNEE Left            Physical Exam    General:  Awake and alert with no acute distress  Eyes:  No icterus  Abdomen:  Soft, non-tender  Incision:  Clean, dry, intact    There were no vitals taken for this  visit.        Milan was seen today for post-op follow-up.    Diagnoses and all orders for this visit:    Status post laparoscopic cholecystectomy    Milan may return to clinic anytime as needed.    Thank you for allowing me to participate in the care of this interesting patient.

## 2019-08-06 ENCOUNTER — TRANSCRIBE ORDERS (OUTPATIENT)
Dept: ADMINISTRATIVE | Facility: HOSPITAL | Age: 64
End: 2019-08-06

## 2019-08-06 DIAGNOSIS — R31.29 OTHER MICROSCOPIC HEMATURIA: Primary | ICD-10-CM

## 2019-08-13 ENCOUNTER — HOSPITAL ENCOUNTER (OUTPATIENT)
Dept: CT IMAGING | Facility: HOSPITAL | Age: 64
Discharge: HOME | End: 2019-08-13
Admitting: UROLOGY

## 2019-08-13 DIAGNOSIS — R31.29 OTHER MICROSCOPIC HEMATURIA: ICD-10-CM

## 2019-08-13 PROCEDURE — 74178 CT ABD&PLV WO CNTR FLWD CNTR: CPT

## 2019-08-13 PROCEDURE — 0 IOPAMIDOL PER 1 ML: Performed by: UROLOGY

## 2019-08-13 RX ADMIN — IOPAMIDOL 100 ML: 755 INJECTION, SOLUTION INTRAVENOUS at 12:50

## 2019-09-09 ENCOUNTER — TELEPHONE (OUTPATIENT)
Dept: ENDOCRINOLOGY | Age: 64
End: 2019-09-09

## 2019-09-09 NOTE — TELEPHONE ENCOUNTER
RECORDS FOR NEW PT ENDO REFERRAL HAVE BEEN GIVEN TO DR LEACH FOR REVIEW. WAITING FOR APPROVAL FROM DR LEACH TO SCHEDULE NEW PT APPT. PT REFERRED BY DR LORIE BELL/[facility].

## 2019-09-12 ENCOUNTER — TELEPHONE (OUTPATIENT)
Dept: ENDOCRINOLOGY | Age: 64
End: 2019-09-12

## 2019-09-13 ENCOUNTER — TELEPHONE (OUTPATIENT)
Dept: ENDOCRINOLOGY | Age: 64
End: 2019-09-13

## 2019-09-13 NOTE — TELEPHONE ENCOUNTER
S/W PT ABOUT NEW PT APPT W/DR LEACH. PT REQUESTED APPT TIME AFTER 10 AM AND BEFORE 2 PM. APPT CONFIRMATION HAS BEEN FAXED TO REFERRING OFFICE OF DR LORIE BELL [phone].

## 2019-10-03 ENCOUNTER — OFFICE VISIT (OUTPATIENT)
Dept: ENDOCRINOLOGY | Age: 64
End: 2019-10-03

## 2019-10-03 VITALS
DIASTOLIC BLOOD PRESSURE: 70 MMHG | OXYGEN SATURATION: 95 % | HEART RATE: 71 BPM | WEIGHT: 125 LBS | SYSTOLIC BLOOD PRESSURE: 136 MMHG | HEIGHT: 60 IN | BODY MASS INDEX: 24.54 KG/M2

## 2019-10-03 DIAGNOSIS — M81.0 AGE-RELATED OSTEOPOROSIS WITHOUT CURRENT PATHOLOGICAL FRACTURE: ICD-10-CM

## 2019-10-03 DIAGNOSIS — E04.1 THYROID NODULE: ICD-10-CM

## 2019-10-03 DIAGNOSIS — R94.6 ABNORMAL THYROID FUNCTION TEST: Primary | ICD-10-CM

## 2019-10-03 DIAGNOSIS — Z72.0 TOBACCO USE: ICD-10-CM

## 2019-10-03 PROCEDURE — 99204 OFFICE O/P NEW MOD 45 MIN: CPT | Performed by: INTERNAL MEDICINE

## 2019-10-03 RX ORDER — BUSPIRONE HYDROCHLORIDE 5 MG/1
TABLET ORAL
COMMUNITY
Start: 2019-09-18 | End: 2023-01-16

## 2019-10-03 NOTE — PROGRESS NOTES
Chief Complaint   Patient presents with   • Abnormal Lab   NEW PATIENT APPOINTMENT/ LOW TSH  HPI   Milan Hinds,64 y.o. WF is here as a new pt for abnormal thyroid labs. Consulted by Dr. Warner.   In August 2019 on her routine evaluation patient's thyroid levels were checked.  She was noted to have a low TSH value of 0.11.  Free T4 levels were noted to be within normal limits.    She was never on any thyroid medication.  No known family history of thyroid disease.    Today in the clinic patient does report feeling tired, weight has been stable, complains of dry skin and hair loss.  Also reports cold intolerance and sleep disturbance.  Occasional tremors, no racing of heart or eye symptoms  No shortness of breath but does complain of difficulty in swallowing and sometimes even choking, no change in voice.    She is in menopause, she has 2 kids of her own.      Reviewed primary care physician's/consulting physician documentation and lab results :     I have reviewed the patient's allergies, medicines, past medical hx, family hx and social hx in detail.    Past Medical History:   Diagnosis Date   • Anxiety    • Arthritis    • Back pain    • Carotid artery stenosis    • Cataract    • Chronic pain     has had epidural injections, goes to [facility]   • COPD (chronic obstructive pulmonary disease) (CMS/[facility])    • Diverticular disease    • Dysphagia     history of   • Failed back syndrome, lumbar     failed fusion   • Frequent falls     history of, none recently   • Gallstones     sched lap buck   • GERD (gastroesophageal reflux disease)    • History of transfusion 1978   • Hypertension    • IBS (irritable bowel syndrome)     history of   • Kidney scarring     right   • Murmur    • Sciatic pain     bilateral   • Vitamin D deficiency        Family History   Problem Relation Age of Onset   • Pancreatic cancer Sister    • Pancreatic cancer Sister    • Malig Hyperthermia Neg Hx        Social History     Socioeconomic  History   • Marital status:      Spouse name: Not on file   • Number of children: Not on file   • Years of education: Some college   • Highest education level: Not on file   Occupational History   • Occupation:    Tobacco Use   • Smoking status: Current Every Day Smoker     Packs/day: 1.00     Years: 45.00     Pack years: 45.00     Types: Cigarettes   • Smokeless tobacco: Never Used   Substance and Sexual Activity   • Alcohol use: No   • Drug use: No   • Sexual activity: Defer       Allergies   Allergen Reactions   • No Known Drug Allergy          Current Outpatient Medications:   •  aspirin 81 MG EC tablet, Take 81 mg by mouth Daily., Disp: , Rfl:   •  budesonide-formoterol (SYMBICORT) 160-4.5 MCG/ACT inhaler, Inhale 2 puffs Daily., Disp: , Rfl:   •  diphenhydrAMINE-acetaminophen (TYLENOL PM)  MG tablet per tablet, Take 1 tablet by mouth At Night As Needed for Sleep., Disp: , Rfl:   •  lisinopril (PRINIVIL,ZESTRIL) 10 MG tablet, Take 10 mg by mouth Daily., Disp: , Rfl:   •  omeprazole (priLOSEC) 20 MG capsule, Take 20 mg by mouth 2 (Two) Times a Day., Disp: , Rfl:   •  oxyCODONE (OXYCONTIN) 10 MG 12 hr tablet, Take 10 mg by mouth 3 (Three) Times a Day As Needed for Severe Pain ., Disp: , Rfl:   •  pregabalin (LYRICA) 75 MG capsule, Take 75 mg by mouth 3 (Three) Times a Day., Disp: , Rfl:   •  tiZANidine (ZANAFLEX) 4 MG tablet, Take 4 mg by mouth Every 8 (Eight) Hours As Needed (breakkthrough pain). Ok to break in half, do not exceed 3 tablets per day, Disp: , Rfl:   •  busPIRone (BUSPAR) 5 MG tablet, , Disp: , Rfl:      Review of Systems   Constitutional: Positive for appetite change and fatigue. Negative for fever.   HENT: Positive for trouble swallowing.    Eyes: Negative for visual disturbance.   Respiratory: Negative for shortness of breath.    Cardiovascular: Positive for palpitations. Negative for leg swelling.   Gastrointestinal: Negative for abdominal pain and  "vomiting.   Endocrine: Negative for polydipsia and polyuria.   Musculoskeletal: Negative for joint swelling and neck pain.   Skin: Negative for rash.   Neurological: Negative for weakness and numbness.   Psychiatric/Behavioral: Negative for behavioral problems.       Objective:    /70   Pulse 71   Ht 152.4 cm (60\")   Wt 56.7 kg (125 lb)   SpO2 95%   BMI 24.41 kg/m²     Physical Exam   Constitutional: She is oriented to person, place, and time. She appears well-nourished.   HENT:   Head: Normocephalic and atraumatic.   Eyes: Conjunctivae and EOM are normal. No scleral icterus.   Neck: Normal range of motion. Neck supple. No thyromegaly present.   Cardiovascular: Normal rate and normal heart sounds. Exam reveals no friction rub.   No murmur heard.  Pulmonary/Chest: Effort normal and breath sounds normal. No stridor. She has no wheezes. She has no rales.   Abdominal: Soft. Bowel sounds are normal. She exhibits no distension. There is no tenderness.   Musculoskeletal: She exhibits no edema or tenderness.   Stupor   Lymphadenopathy:     She has no cervical adenopathy.   Neurological: She is alert and oriented to person, place, and time.   Skin: Skin is warm and dry. She is not diaphoretic.   Psychiatric: She has a normal mood and affect.   Vitals reviewed.      Results Review:    I reviewed the patient's new clinical results.    Admission on 07/18/2019, Discharged on 07/18/2019   Component Date Value Ref Range Status   • Glucose 07/18/2019 83  65 - 99 mg/dL Final   • BUN 07/18/2019 6* 8 - 23 mg/dL Final   • Creatinine 07/18/2019 0.82  0.57 - 1.00 mg/dL Final   • Sodium 07/18/2019 143  136 - 145 mmol/L Final   • Potassium 07/18/2019 3.8  3.5 - 5.2 mmol/L Final   • Chloride 07/18/2019 108* 98 - 107 mmol/L Final   • CO2 07/18/2019 22.6  22.0 - 29.0 mmol/L Final   • Calcium 07/18/2019 8.9  8.6 - 10.5 mg/dL Final   • Total Protein 07/18/2019 6.5  6.0 - 8.5 g/dL Final   • Albumin 07/18/2019 3.80  3.50 - 5.20 g/dL " Final   • ALT (SGPT) 07/18/2019 10  1 - 33 U/L Final   • AST (SGOT) 07/18/2019 15  1 - 32 U/L Final   • Alkaline Phosphatase 07/18/2019 82  39 - 117 U/L Final   • Total Bilirubin 07/18/2019 0.5  0.2 - 1.2 mg/dL Final   • eGFR Non African Amer 07/18/2019 70  >60 mL/min/1.73 Final   • Globulin 07/18/2019 2.7  gm/dL Final   • A/G Ratio 07/18/2019 1.4  g/dL Final   • BUN/Creatinine Ratio 07/18/2019 7.3  7.0 - 25.0 Final   • Anion Gap 07/18/2019 12.4  5.0 - 15.0 mmol/L Final   • WBC 07/18/2019 6.63  3.40 - 10.80 10*3/mm3 Final   • RBC 07/18/2019 4.05  3.77 - 5.28 10*6/mm3 Final   • Hemoglobin 07/18/2019 12.2  12.0 - 15.9 g/dL Final   • Hematocrit 07/18/2019 36.8  34.0 - 46.6 % Final   • MCV 07/18/2019 90.9  79.0 - 97.0 fL Final   • MCH 07/18/2019 30.1  26.6 - 33.0 pg Final   • MCHC 07/18/2019 33.2  31.5 - 35.7 g/dL Final   • RDW 07/18/2019 13.2  12.3 - 15.4 % Final   • RDW-SD 07/18/2019 43.8  37.0 - 54.0 fl Final   • MPV 07/18/2019 9.1  6.0 - 12.0 fL Final   • Platelets 07/18/2019 285  140 - 450 10*3/mm3 Final   • Neutrophil % 07/18/2019 40.4* 42.7 - 76.0 % Final   • Lymphocyte % 07/18/2019 49.0* 19.6 - 45.3 % Final   • Monocyte % 07/18/2019 7.8  5.0 - 12.0 % Final   • Eosinophil % 07/18/2019 1.8  0.3 - 6.2 % Final   • Basophil % 07/18/2019 0.8  0.0 - 1.5 % Final   • Immature Grans % 07/18/2019 0.2  0.0 - 0.5 % Final   • Neutrophils, Absolute 07/18/2019 2.68  1.70 - 7.00 10*3/mm3 Final   • Lymphocytes, Absolute 07/18/2019 3.25* 0.70 - 3.10 10*3/mm3 Final   • Monocytes, Absolute 07/18/2019 0.52  0.10 - 0.90 10*3/mm3 Final   • Eosinophils, Absolute 07/18/2019 0.12  0.00 - 0.40 10*3/mm3 Final   • Basophils, Absolute 07/18/2019 0.05  0.00 - 0.20 10*3/mm3 Final   • Immature Grans, Absolute 07/18/2019 0.01  0.00 - 0.05 10*3/mm3 Final   • nRBC 07/18/2019 0.0  0.0 - 0.2 /100 WBC Final   • Case Report 07/18/2019    Final                    Value:Surgical Pathology Report                         Case: [id number]                     "              Authorizing Provider:  Deb Perez DO    Collected:           07/18/2019 10:48 AM          Ordering Location:     [facility]   Received:            07/18/2019 11:49 AM                                 OR                                                                           Pathologist:           Mario Hassan MD                                                         Specimen:    Gallbladder                                                                               • Final Diagnosis 07/18/2019    Final                    Value:This result contains rich text formatting which cannot be displayed here.   • Gross Description 07/18/2019    Final                    Value:This result contains rich text formatting which cannot be displayed here.   • Microscopic Description 07/18/2019    Final                    Value:This result contains rich text formatting which cannot be displayed here.       Milan was seen today for abnormal lab.    Diagnoses and all orders for this visit:    Abnormal thyroid function test  -     TSH  -     T4, Free  -     T3, Free  -     Thyroid Peroxidase Antibody  -     Thyroid Stimulating Immunoglobulin  -     Comprehensive Metabolic Panel  -     TSH; Future  -     T4, Free; Future  -     T3, Free; Future  -     US Thyroid; Future    Age-related osteoporosis without current pathological fracture  -     DEXA Bone Density Axial; Future    Thyroid nodule  -     US Thyroid; Future      Abnormal thyroid levels  Check thyroid panel  Check TPO antibody and TSI levels  Discussed with the patient about starting on methimazole if her levels are still abnormal     osteoporosis  Proceed with bone density scan    Thyroid nodules palpable on examination  Proceed with thyroid ultrasound.      Thank you for asking me to see your patient Milan Hinds in consultation.        Immanuel Michael MD  10/03/19    EMR Dragon / transcription disclaimer:     \"Dictated utilizing " "Dragon dictation\".         "

## 2019-10-03 NOTE — PATIENT INSTRUCTIONS
For more information:  Quit Now [place]  1-800-QUIT-NOW  https://[place].quitlogix.org/en-[place]/      Steps to Quit Smoking    Smoking tobacco can be harmful to your health and can affect almost every organ in your body. Smoking puts you, and those around you, at risk for developing many serious chronic diseases. Quitting smoking is difficult, but it is one of the best things that you can do for your health. It is never too late to quit.  What are the benefits of quitting smoking?  When you quit smoking, you lower your risk of developing serious diseases and conditions, such as:  · Lung cancer or lung disease, such as COPD.  · Heart disease.  · Stroke.  · Heart attack.  · Infertility.  · Osteoporosis and bone fractures.  Additionally, symptoms such as coughing, wheezing, and shortness of breath may get better when you quit. You may also find that you get sick less often because your body is stronger at fighting off colds and infections. If you are pregnant, quitting smoking can help to reduce your chances of having a baby of low birth weight.  How do I get ready to quit?  When you decide to quit smoking, create a plan to make sure that you are successful. Before you quit:  · Pick a date to quit. Set a date within the next two weeks to give you time to prepare.  · Write down the reasons why you are quitting. Keep this list in places where you will see it often, such as on your bathroom mirror or in your car or wallet.  · Identify the people, places, things, and activities that make you want to smoke (triggers) and avoid them. Make sure to take these actions:  ? Throw away all cigarettes at home, at work, and in your car.  ? Throw away smoking accessories, such as ashtrays and lighters.  ? Clean your car and make sure to empty the ashtray.  ? Clean your home, including curtains and carpets.  · Tell your family, friends, and coworkers that you are quitting. Support from your loved ones can make quitting  easier.  · Talk with your health care provider about your options for quitting smoking.  · Find out what treatment options are covered by your health insurance.  What strategies can I use to quit smoking?  Talk with your healthcare provider about different strategies to quit smoking. Some strategies include:  · Quitting smoking altogether instead of gradually lessening how much you smoke over a period of time. Research shows that quitting “cold turkey” is more successful than gradually quitting.  · Attending in-person counseling to help you build problem-solving skills. You are more likely to have success in quitting if you attend several counseling sessions. Even short sessions of 10 minutes can be effective.  · Finding resources and support systems that can help you to quit smoking and remain smoke-free after you quit. These resources are most helpful when you use them often. They can include:  ? Online chats with a counselor.  ? Telephone quitlines.  ? Printed self-help materials.  ? Support groups or group counseling.  ? Text messaging programs.  ? Mobile phone applications.  · Taking medicines to help you quit smoking. (If you are pregnant or breastfeeding, talk with your health care provider first.) Some medicines contain nicotine and some do not. Both types of medicines help with cravings, but the medicines that include nicotine help to relieve withdrawal symptoms. Your health care provider may recommend:  ? Nicotine patches, gum, or lozenges.  ? Nicotine inhalers or sprays.  ? Non-nicotine medicine that is taken by mouth.  Talk with your health care provider about combining strategies, such as taking medicines while you are also receiving in-person counseling. Using these two strategies together makes you more likely to succeed in quitting than if you used either strategy on its own.  If you are pregnant or breastfeeding, talk with your health care provider about finding counseling or other support  strategies to quit smoking. Do not take medicine to help you quit smoking unless told to do so by your health care provider.  What things can I do to make it easier to quit?  Quitting smoking might feel overwhelming at first, but there is a lot that you can do to make it easier. Take these important actions:  · Reach out to your family and friends and ask that they support and encourage you during this time. Call telephone quitlines, reach out to support groups, or work with a counselor for support.  · Ask people who smoke to avoid smoking around you.  · Avoid places that trigger you to smoke, such as bars, parties, or smoke-break areas at work.  · Spend time around people who do not smoke.  · Lessen stress in your life, because stress can be a smoking trigger for some people. To lessen stress, try:  ? Exercising regularly.  ? Deep-breathing exercises.  ? Yoga.  ? Meditating.  ? Performing a body scan. This involves closing your eyes, scanning your body from head to toe, and noticing which parts of your body are particularly tense. Purposefully relax the muscles in those areas.  · Download or purchase mobile phone or tablet apps (applications) that can help you stick to your quit plan by providing reminders, tips, and encouragement. There are many free apps, such as QuitGuide from the CDC (Centers for Disease Control and Prevention). You can find other support for quitting smoking (smoking cessation) through smokefree.gov and other websites.  How will I feel when I quit smoking?  Within the first 24 hours of quitting smoking, you may start to feel some withdrawal symptoms. These symptoms are usually most noticeable 2-3 days after quitting, but they usually do not last beyond 2-3 weeks. Changes or symptoms that you might experience include:  · Mood swings.  · Restlessness, anxiety, or irritation.  · Difficulty concentrating.  · Dizziness.  · Strong cravings for sugary foods in addition to nicotine.  · Mild weight  gain.  · Constipation.  · Nausea.  · Coughing or a sore throat.  · Changes in how your medicines work in your body.  · A depressed mood.  · Difficulty sleeping (insomnia).  After the first 2-3 weeks of quitting, you may start to notice more positive results, such as:  · Improved sense of smell and taste.  · Decreased coughing and sore throat.  · Slower heart rate.  · Lower blood pressure.  · Clearer skin.  · The ability to breathe more easily.  · Fewer sick days.  Quitting smoking is very challenging for most people. Do not get discouraged if you are not successful the first time. Some people need to make many attempts to quit before they achieve long-term success. Do your best to stick to your quit plan, and talk with your health care provider if you have any questions or concerns.  This information is not intended to replace advice given to you by your health care provider. Make sure you discuss any questions you have with your health care provider.  Document Released: 12/12/2002 Document Revised: 07/24/2018 Document Reviewed: 05/03/2016  [employer] Interactive Patient Education © 2019 [employer]

## 2019-10-04 LAB
ALBUMIN SERPL-MCNC: 4.2 G/DL (ref 3.5–5.2)
ALBUMIN/GLOB SERPL: 1.7 G/DL
ALP SERPL-CCNC: 100 U/L (ref 39–117)
ALT SERPL-CCNC: 14 U/L (ref 1–33)
AST SERPL-CCNC: 19 U/L (ref 1–32)
BILIRUB SERPL-MCNC: 0.4 MG/DL (ref 0.2–1.2)
BUN SERPL-MCNC: 6 MG/DL (ref 8–23)
BUN/CREAT SERPL: 7.1 (ref 7–25)
CALCIUM SERPL-MCNC: 9.2 MG/DL (ref 8.6–10.5)
CHLORIDE SERPL-SCNC: 103 MMOL/L (ref 98–107)
CO2 SERPL-SCNC: 26.5 MMOL/L (ref 22–29)
CREAT SERPL-MCNC: 0.84 MG/DL (ref 0.57–1)
GLOBULIN SER CALC-MCNC: 2.5 GM/DL
GLUCOSE SERPL-MCNC: 84 MG/DL (ref 65–99)
POTASSIUM SERPL-SCNC: 4 MMOL/L (ref 3.5–5.2)
PROT SERPL-MCNC: 6.7 G/DL (ref 6–8.5)
SODIUM SERPL-SCNC: 144 MMOL/L (ref 136–145)
T3FREE SERPL-MCNC: 4.2 PG/ML (ref 2–4.4)
T4 FREE SERPL-MCNC: 1.21 NG/DL (ref 0.93–1.7)
THYROPEROXIDASE AB SERPL-ACNC: 14 IU/ML (ref 0–34)
TSH SERPL DL<=0.005 MIU/L-ACNC: 0.27 UIU/ML (ref 0.27–4.2)
TSI SER-ACNC: <0.1 IU/L (ref 0–0.55)

## 2019-10-08 ENCOUNTER — TELEPHONE (OUTPATIENT)
Dept: ENDOCRINOLOGY | Age: 64
End: 2019-10-08

## 2019-10-10 DIAGNOSIS — Z78.0 POST-MENOPAUSE: Primary | ICD-10-CM

## 2019-10-11 ENCOUNTER — APPOINTMENT (OUTPATIENT)
Dept: BONE DENSITY | Facility: HOSPITAL | Age: 64
End: 2019-10-11

## 2019-10-11 ENCOUNTER — APPOINTMENT (OUTPATIENT)
Dept: ULTRASOUND IMAGING | Facility: HOSPITAL | Age: 64
End: 2019-10-11

## 2019-10-15 ENCOUNTER — APPOINTMENT (OUTPATIENT)
Dept: ULTRASOUND IMAGING | Facility: HOSPITAL | Age: 64
End: 2019-10-15

## 2019-10-15 ENCOUNTER — APPOINTMENT (OUTPATIENT)
Dept: BONE DENSITY | Facility: HOSPITAL | Age: 64
End: 2019-10-15

## 2019-10-31 ENCOUNTER — APPOINTMENT (OUTPATIENT)
Dept: BONE DENSITY | Facility: HOSPITAL | Age: 64
End: 2019-10-31

## 2019-10-31 ENCOUNTER — HOSPITAL ENCOUNTER (OUTPATIENT)
Dept: ULTRASOUND IMAGING | Facility: HOSPITAL | Age: 64
Discharge: HOME | End: 2019-10-31
Admitting: INTERNAL MEDICINE

## 2019-10-31 DIAGNOSIS — R94.6 ABNORMAL THYROID FUNCTION TEST: ICD-10-CM

## 2019-10-31 DIAGNOSIS — E04.1 THYROID NODULE: ICD-10-CM

## 2019-10-31 DIAGNOSIS — Z78.0 POST-MENOPAUSE: ICD-10-CM

## 2019-10-31 PROCEDURE — 76536 US EXAM OF HEAD AND NECK: CPT

## 2019-10-31 PROCEDURE — 77080 DXA BONE DENSITY AXIAL: CPT

## 2020-01-01 ENCOUNTER — RESULTS ENCOUNTER (OUTPATIENT)
Dept: ENDOCRINOLOGY | Age: 65
End: 2020-01-01

## 2020-01-01 DIAGNOSIS — R94.6 ABNORMAL THYROID FUNCTION TEST: ICD-10-CM

## 2021-02-25 ENCOUNTER — OFFICE VISIT (OUTPATIENT)
Dept: CARDIOLOGY | Facility: CLINIC | Age: 66
End: 2021-02-25

## 2021-02-25 VITALS
SYSTOLIC BLOOD PRESSURE: 130 MMHG | OXYGEN SATURATION: 98 % | DIASTOLIC BLOOD PRESSURE: 82 MMHG | HEART RATE: 80 BPM | WEIGHT: 122 LBS | BODY MASS INDEX: 23.95 KG/M2 | HEIGHT: 60 IN

## 2021-02-25 DIAGNOSIS — I10 ESSENTIAL HYPERTENSION: ICD-10-CM

## 2021-02-25 DIAGNOSIS — Z01.810 PREOPERATIVE CARDIOVASCULAR EXAMINATION: ICD-10-CM

## 2021-02-25 DIAGNOSIS — R01.1 HEART MURMUR: Primary | ICD-10-CM

## 2021-02-25 DIAGNOSIS — E78.00 HYPERCHOLESTEROLEMIA: ICD-10-CM

## 2021-02-25 PROCEDURE — 99204 OFFICE O/P NEW MOD 45 MIN: CPT | Performed by: INTERNAL MEDICINE

## 2021-02-25 PROCEDURE — 93000 ELECTROCARDIOGRAM COMPLETE: CPT | Performed by: INTERNAL MEDICINE

## 2021-02-25 NOTE — PROGRESS NOTES
"PATIENTINFORMATION    Date of Office Visit: 2021  Encounter Provider: Italo Cespedes MD  Place of Service: [facility] CARDIOLOGY  Patient Name: Milan Hinds  : 1955    Subjective:     Encounter Date:2021      Patient ID: Milan Hinds is a 66 y.o. female.    Chief Complaint   Patient presents with   • Hypertension   • COPD     HPI  Ms. Hinds is a 66 years old female patient with past medical history of hypertension, heavy tobacco use, COPD and chronic severe back pain referred to cardiology clinic for preoperative cardiovascular examination for pain pump placement.  Even if she does not exercise regularly she is pretty active at home taking care of her kids in the house\" walk a block or more without stopping and without any significant chest pain or shortness of breath.  She reports she is able to walk 3 flights of stairs without stopping.  Significant limiting symptom during activities is severe low back pain for which she had back infusion in the past and now she is going to get pain pump.  She denied any orthopnea, PND, extremity swelling, presyncope or syncope, palpitations or any known prior cardiovascular illness other than hypertension.  She denied any history of stroke, diabetes or chronic kidney disease.    She is a lifetime heavy smoker and currently cut down to half a pack per day.  She denied any recreational drug use or alcohol abuse.      ROS   All systems reviewed and negative except as noted in HPI.    Past Medical History:   Diagnosis Date   • Anxiety    • Arthritis    • Back pain    • Carotid artery stenosis    • Cataract    • Chronic pain     has had epidural injections, goes to [facility]   • COPD (chronic obstructive pulmonary disease) (CMS/[facility])    • Diverticular disease    • Dysphagia     history of   • Failed back syndrome, lumbar     failed fusion   • Frequent falls     history of, none recently   • Gallstones     sched lap buck   • GERD " (gastroesophageal reflux disease)    • History of transfusion    • Hypertension    • IBS (irritable bowel syndrome)     history of   • Kidney scarring     right   • Murmur    • Sciatic pain     bilateral   • Vitamin D deficiency        Past Surgical History:   Procedure Laterality Date   • BACK SURGERY      lumbar fusion   • BLADDER SURGERY      bladder lift   • BUNIONECTOMY Left 2016    Procedure: LAPIDUS BUNIONECTOMY LEFT FOOT;  Surgeon: Raphael Storm DPM;  Location: [facility] OR;  Service:    •  SECTION      x2   • CHOLECYSTECTOMY WITH INTRAOPERATIVE CHOLANGIOGRAM N/A 2019    Procedure: CHOLECYSTECTOMY LAPAROSCOPIC INTRAOPERATIVE CHOLANGIOGRAM;  Surgeon: Deb Perez DO;  Location: [facility] OR;  Service: General   • COLONOSCOPY  2013    normal per pt.   • ENDOSCOPY N/A 2017    Procedure: ESOPHAGOGASTRODUODENOSCOPY w/ biopsies;  Surgeon: Garth Morales MD;  Location: [facility] OR;  Service:    • HYSTERECTOMY     • INSERTION / REMOVAL EPIDURAL SPINAL NEUROSTIMULATOR      removed   • NECK SURGERY      C6C7 fusion & C7C8 fusion   • REPLACEMENT TOTAL KNEE Left        Social History     Socioeconomic History   • Marital status:      Spouse name: Not on file   • Number of children: Not on file   • Years of education: Some college   • Highest education level: Not on file   Occupational History   • Occupation:    Tobacco Use   • Smoking status: Current Every Day Smoker     Packs/day: 1.00     Years: 45.00     Pack years: 45.00     Types: Cigarettes   • Smokeless tobacco: Never Used   • Tobacco comment: admits to smoking since age 16 1ppd    Substance and Sexual Activity   • Alcohol use: No   • Drug use: No   • Sexual activity: Defer       Family History   Problem Relation Age of Onset   • Pancreatic cancer Sister    • Pancreatic cancer Sister    • Malig Hyperthermia Neg Hx            ECG 12 Lead    Date/Time: 2021 2:48 PM  Performed by:  "Italo Cespedes MD  Authorized by: Italo Cespedes MD   Comparison: compared with previous ECG from 7/12/2019  Similar to previous ECG  Rhythm: sinus rhythm  Rate: normal  Conduction: conduction normal  ST Segments: ST segments normal  T Waves: T waves normal  QRS axis: normal  Other: no other findings    Clinical impression: normal ECG               Objective:     /82   Pulse 80   Ht 152.4 cm (60\")   Wt 55.3 kg (122 lb)   SpO2 98%   BMI 23.83 kg/m²  Body mass index is 23.83 kg/m².     Constitutional:       General: Not in acute distress.     Appearance: Well-developed. Not diaphoretic.   Eyes:      Pupils: Pupils are equal, round, and reactive to light.   HENT:      Head: Normocephalic and atraumatic.   Neck:      Musculoskeletal: Normal range of motion and neck supple.      Thyroid: No thyromegaly.   Pulmonary:      Effort: Pulmonary effort is normal. No respiratory distress.      Breath sounds: Normal breath sounds. No wheezing. No rales.   Chest:      Chest wall: Not tender to palpatation.   Cardiovascular:      Normal rate. Regular rhythm.      Murmurs: There is a systolic murmur at the URSB.      No gallop.   Pulses:     Intact distal pulses.   Edema:     Peripheral edema absent.   Abdominal:      General: Bowel sounds are normal. There is no distension.      Palpations: Abdomen is soft.      Tenderness: There is no guarding.   Musculoskeletal: Normal range of motion.         General: No deformity.   Skin:     General: Skin is warm and dry.      Findings: No rash.   Neurological:      Mental Status: Alert and oriented to person, place, and time.      Cranial Nerves: No cranial nerve deficit.      Deep Tendon Reflexes: Reflexes are normal and symmetric.   Psychiatric:         Judgment: Judgment normal.         Review Of Data: I reviewed prior medical documentations      Assessment/Plan:         Preoperative cardiovascular exam  Severe low back pain  Essential " hypertension  Hypercholesterolemia  Chronic heavy tobacco use  Heart murmur-echo done in 2018 showing mild to moderate MR but preserved left ventricular ejection fraction.    Revised cardiac risk score index is 0, METS approaching 4 and patient denied any active cardiac symptoms.  She has left left upper sternal border murmur.  I will get echocardiogram before she gets surgery to rule out any significant valvular heart disease.  Patient would definitely benefit from starting a statin.  Blood pressure is well controlled on current regimen.      Addendum: Echocardiogram result done outside on 3/7/2021 fax it to clinic.  LVEF 68.6%, reported mitral some without elevated obstruction, trace MR, trace AI, grade 1 diastolic dysfunction.  Patient told me she was started on a statin by by her PCP recently.     Patient is overall a moderate risk but acceptable cardiac risk and she can go ahead get necessary surgeries without further testing .    Thank you for consulting with cardiology.    Diagnosis and plan of care discussed with patient and verbalized understanding.           Italo Cespedes MD  02/25/21  15:10 EST

## 2021-03-04 ENCOUNTER — APPOINTMENT (OUTPATIENT)
Dept: CARDIOLOGY | Facility: HOSPITAL | Age: 66
End: 2021-03-04

## 2021-03-16 ENCOUNTER — TELEPHONE (OUTPATIENT)
Dept: CARDIOLOGY | Facility: CLINIC | Age: 66
End: 2021-03-16

## 2021-03-25 ENCOUNTER — APPOINTMENT (OUTPATIENT)
Dept: CARDIOLOGY | Facility: HOSPITAL | Age: 66
End: 2021-03-25

## 2021-06-14 ENCOUNTER — LAB REQUISITION (OUTPATIENT)
Dept: LAB | Facility: HOSPITAL | Age: 66
End: 2021-06-14

## 2021-06-14 DIAGNOSIS — T81.31XD DISRUPTION OF EXTERNAL OPERATION (SURGICAL) WOUND, NOT ELSEWHERE CLASSIFIED, SUBSEQUENT ENCOUNTER: ICD-10-CM

## 2021-06-14 DIAGNOSIS — Z96.89 PRESENCE OF OTHER SPECIFIED FUNCTIONAL IMPLANTS: ICD-10-CM

## 2021-06-14 LAB
ALBUMIN SERPL-MCNC: 3.3 G/DL (ref 3.5–5.2)
ALBUMIN/GLOB SERPL: 1 G/DL
ALP SERPL-CCNC: 122 U/L (ref 39–117)
ALT SERPL W P-5'-P-CCNC: 9 U/L (ref 1–33)
ANION GAP SERPL CALCULATED.3IONS-SCNC: 13.9 MMOL/L (ref 5–15)
AST SERPL-CCNC: 17 U/L (ref 1–32)
BASOPHILS # BLD AUTO: 0.04 10*3/MM3 (ref 0–0.2)
BASOPHILS NFR BLD AUTO: 0.6 % (ref 0–1.5)
BILIRUB SERPL-MCNC: 0.3 MG/DL (ref 0–1.2)
BUN SERPL-MCNC: 7 MG/DL (ref 8–23)
BUN/CREAT SERPL: 9.7 (ref 7–25)
CALCIUM SPEC-SCNC: 8.9 MG/DL (ref 8.6–10.5)
CHLORIDE SERPL-SCNC: 94 MMOL/L (ref 98–107)
CO2 SERPL-SCNC: 28.1 MMOL/L (ref 22–29)
CREAT SERPL-MCNC: 0.72 MG/DL (ref 0.57–1)
CRP SERPL-MCNC: 0.6 MG/DL (ref 0–0.5)
DEPRECATED RDW RBC AUTO: 43.6 FL (ref 37–54)
EOSINOPHIL # BLD AUTO: 0.2 10*3/MM3 (ref 0–0.4)
EOSINOPHIL NFR BLD AUTO: 3.1 % (ref 0.3–6.2)
ERYTHROCYTE [DISTWIDTH] IN BLOOD BY AUTOMATED COUNT: 13.4 % (ref 12.3–15.4)
ERYTHROCYTE [SEDIMENTATION RATE] IN BLOOD: 39 MM/HR (ref 0–20)
GFR SERPL CREATININE-BSD FRML MDRD: 81 ML/MIN/1.73
GLOBULIN UR ELPH-MCNC: 3.2 GM/DL
GLUCOSE SERPL-MCNC: 106 MG/DL (ref 65–99)
HCT VFR BLD AUTO: 32.7 % (ref 34–46.6)
HGB BLD-MCNC: 10.6 G/DL (ref 12–15.9)
IMM GRANULOCYTES # BLD AUTO: 0.01 10*3/MM3 (ref 0–0.05)
IMM GRANULOCYTES NFR BLD AUTO: 0.2 % (ref 0–0.5)
LYMPHOCYTES # BLD AUTO: 2.3 10*3/MM3 (ref 0.7–3.1)
LYMPHOCYTES NFR BLD AUTO: 35.7 % (ref 19.6–45.3)
MCH RBC QN AUTO: 28.6 PG (ref 26.6–33)
MCHC RBC AUTO-ENTMCNC: 32.4 G/DL (ref 31.5–35.7)
MCV RBC AUTO: 88.4 FL (ref 79–97)
MONOCYTES # BLD AUTO: 0.54 10*3/MM3 (ref 0.1–0.9)
MONOCYTES NFR BLD AUTO: 8.4 % (ref 5–12)
NEUTROPHILS NFR BLD AUTO: 3.36 10*3/MM3 (ref 1.7–7)
NEUTROPHILS NFR BLD AUTO: 52 % (ref 42.7–76)
PLATELET # BLD AUTO: 358 10*3/MM3 (ref 140–450)
PMV BLD AUTO: 9.6 FL (ref 6–12)
POTASSIUM SERPL-SCNC: 2.6 MMOL/L (ref 3.5–5.2)
PROT SERPL-MCNC: 6.5 G/DL (ref 6–8.5)
RBC # BLD AUTO: 3.7 10*6/MM3 (ref 3.77–5.28)
SODIUM SERPL-SCNC: 136 MMOL/L (ref 136–145)
VANCOMYCIN TROUGH SERPL-MCNC: 17.4 MCG/ML (ref 5–20)
WBC # BLD AUTO: 6.45 10*3/MM3 (ref 3.4–10.8)

## 2021-06-14 PROCEDURE — 80202 ASSAY OF VANCOMYCIN: CPT | Performed by: INTERNAL MEDICINE

## 2021-06-14 PROCEDURE — 80053 COMPREHEN METABOLIC PANEL: CPT | Performed by: INTERNAL MEDICINE

## 2021-06-14 PROCEDURE — 86140 C-REACTIVE PROTEIN: CPT | Performed by: INTERNAL MEDICINE

## 2021-06-14 PROCEDURE — 85025 COMPLETE CBC W/AUTO DIFF WBC: CPT | Performed by: INTERNAL MEDICINE

## 2021-06-14 PROCEDURE — 85652 RBC SED RATE AUTOMATED: CPT | Performed by: INTERNAL MEDICINE

## 2022-05-19 ENCOUNTER — HOSPITAL ENCOUNTER (OUTPATIENT)
Dept: GENERAL RADIOLOGY | Facility: HOSPITAL | Age: 67
Discharge: HOME | End: 2022-05-19

## 2022-05-19 ENCOUNTER — TRANSCRIBE ORDERS (OUTPATIENT)
Dept: ADMINISTRATIVE | Facility: HOSPITAL | Age: 67
End: 2022-05-19

## 2022-05-19 DIAGNOSIS — M54.89 BACK PAIN WITHOUT SCIATICA: ICD-10-CM

## 2022-05-19 DIAGNOSIS — M54.2 NECK PAIN: Primary | ICD-10-CM

## 2022-05-19 DIAGNOSIS — M54.2 NECK PAIN: ICD-10-CM

## 2022-05-19 PROCEDURE — 72040 X-RAY EXAM NECK SPINE 2-3 VW: CPT

## 2022-05-19 PROCEDURE — 72100 X-RAY EXAM L-S SPINE 2/3 VWS: CPT

## 2022-06-02 ENCOUNTER — TRANSCRIBE ORDERS (OUTPATIENT)
Dept: ADMINISTRATIVE | Facility: HOSPITAL | Age: 67
End: 2022-06-02

## 2022-06-02 DIAGNOSIS — M50.30 DDD (DEGENERATIVE DISC DISEASE), CERVICAL: Primary | ICD-10-CM

## 2022-06-30 ENCOUNTER — HOSPITAL ENCOUNTER (OUTPATIENT)
Dept: MRI IMAGING | Facility: HOSPITAL | Age: 67
Discharge: HOME | End: 2022-06-30
Admitting: ORTHOPAEDIC SURGERY

## 2022-06-30 DIAGNOSIS — M50.30 DDD (DEGENERATIVE DISC DISEASE), CERVICAL: ICD-10-CM

## 2022-06-30 PROCEDURE — 72141 MRI NECK SPINE W/O DYE: CPT

## 2023-01-16 ENCOUNTER — OFFICE VISIT (OUTPATIENT)
Dept: SURGERY | Facility: CLINIC | Age: 68
End: 2023-01-16
Payer: MEDICARE

## 2023-01-16 VITALS
BODY MASS INDEX: 23.95 KG/M2 | HEART RATE: 73 BPM | HEIGHT: 60 IN | DIASTOLIC BLOOD PRESSURE: 60 MMHG | SYSTOLIC BLOOD PRESSURE: 112 MMHG | WEIGHT: 122 LBS | OXYGEN SATURATION: 96 %

## 2023-01-16 DIAGNOSIS — K22.70 BARRETT'S ESOPHAGUS WITHOUT DYSPLASIA: ICD-10-CM

## 2023-01-16 DIAGNOSIS — Z72.0 TOBACCO USE: ICD-10-CM

## 2023-01-16 DIAGNOSIS — Z12.11 COLON CANCER SCREENING: ICD-10-CM

## 2023-01-16 DIAGNOSIS — R13.12 OROPHARYNGEAL DYSPHAGIA: Primary | ICD-10-CM

## 2023-01-16 PROCEDURE — 99203 OFFICE O/P NEW LOW 30 MIN: CPT | Performed by: SURGERY

## 2023-01-16 RX ORDER — ROSUVASTATIN CALCIUM 20 MG/1
20 TABLET, COATED ORAL DAILY
Status: ON HOLD | COMMUNITY
End: 2023-02-14

## 2023-01-16 RX ORDER — MONTELUKAST SODIUM 10 MG/1
10 TABLET ORAL NIGHTLY
COMMUNITY

## 2023-01-16 NOTE — H&P
Milan RIOS Guzman 67 y.o. female presents @ the req of Dr. Starr for eval of diff swallowing solids and liquids.   Chief Complaint   Patient presents with   • Difficulty Swallowing             HPI   Above noted and agree.  This very pleasant 67-year-old female is known to my service.  I performed a laparoscopic cholecystectomy on her.  She presents today with difficulty swallowing.  She says she is not sure what will happen.  She will swallow some food and then she will take a drink and unable to swallow it.  She had a colonoscopy 10 years ago.  She says she believes it was normal.  She has had upper scopes in the past and has been diagnosed with Olivier's esophagus as well as candidiasis of the esophagus.  She does still smoke cigarettes but she has cut back.  She moves her bowels daily and does not have any abdominal pain.  She has never had a swallow study.  She has no chest pain or shortness of breath.  She has no fevers or chills.  She has no other complaints.      Review of Systems   All other systems reviewed and are negative.            Current Outpatient Medications:   •  budesonide-formoterol (SYMBICORT) 160-4.5 MCG/ACT inhaler, Inhale 2 puffs Daily., Disp: , Rfl:   •  Calcium 200 MG tablet, Take  by mouth., Disp: , Rfl:   •  diphenhydrAMINE-APAP, sleep, (TYLENOL PM EXTRA STRENGTH PO), Take  by mouth., Disp: , Rfl:   •  Fluticasone-Umeclidin-Vilant (TRELEGY ELLIPTA IN), Inhale., Disp: , Rfl:   •  lisinopril (PRINIVIL,ZESTRIL) 10 MG tablet, Take 20 mg by mouth Daily., Disp: , Rfl:   •  montelukast (SINGULAIR) 10 MG tablet, Take 10 mg by mouth Every Night., Disp: , Rfl:   •  omeprazole (priLOSEC) 20 MG capsule, Take 40 mg by mouth Daily., Disp: , Rfl:   •  oxyCODONE (OXYCONTIN) 10 MG 12 hr tablet, Take 10 mg by mouth 3 (Three) Times a Day As Needed for Severe Pain ., Disp: , Rfl:   •  pregabalin (LYRICA) 75 MG capsule, Take 75 mg by mouth 3 (Three) Times a Day., Disp: , Rfl:   •  rosuvastatin (CRESTOR) 20 MG  tablet, Take 20 mg by mouth Daily., Disp: , Rfl:         Allergies   Allergen Reactions   • No Known Drug Allergy            Past Medical History:   Diagnosis Date   • Anxiety    • Arthritis    • Back pain    • Carotid artery stenosis    • Cataract    • Chronic pain     has had epidural injections, goes to [facility]   • COPD (chronic obstructive pulmonary disease) (CMS/HCC)    • Diverticular disease    • Dysphagia     history of   • Failed back syndrome, lumbar     failed fusion   • Frequent falls     history of, none recently   • Gallstones     sched lap buck   • GERD (gastroesophageal reflux disease)    • History of transfusion    • Hypertension    • IBS (irritable bowel syndrome)     history of   • Kidney scarring     right   • Murmur    • Sciatic pain     bilateral   • Vitamin D deficiency            Past Surgical History:   Procedure Laterality Date   • BACK SURGERY      lumbar fusion   • BLADDER SURGERY      bladder lift   • BUNIONECTOMY Left 2016    Procedure: LAPIDUS BUNIONECTOMY LEFT FOOT;  Surgeon: Raphael Storm DPM;  Location: [facility] OR;  Service:    •  SECTION      x2   • CHOLECYSTECTOMY WITH INTRAOPERATIVE CHOLANGIOGRAM N/A 2019    Procedure: CHOLECYSTECTOMY LAPAROSCOPIC INTRAOPERATIVE CHOLANGIOGRAM;  Surgeon: Deb Perez DO;  Location: [facility] OR;  Service: General   • COLONOSCOPY  2013    normal per pt.   • ENDOSCOPY N/A 2017    Procedure: ESOPHAGOGASTRODUODENOSCOPY w/ biopsies;  Surgeon: Garth Morales MD;  Location: [facility] OR;  Service:    • HYSTERECTOMY     • INSERTION / REMOVAL EPIDURAL SPINAL NEUROSTIMULATOR      removed   • NECK SURGERY      C6C7 fusion & C7C8 fusion   • REPLACEMENT TOTAL KNEE Left            Social History     Tobacco Use   • Smoking status: Every Day     Packs/day: 1.00     Years: 45.00     Pack years: 45.00     Types: Cigarettes   • Smokeless tobacco: Never   • Tobacco comments:     admits to smoking since age 16 1ppd   "  Vaping Use   • Vaping Use: Never used   Substance Use Topics   • Alcohol use: No   • Drug use: No             There is no immunization history on file for this patient.        Physical Exam  Vitals and nursing note reviewed.   Constitutional:       Appearance: Normal appearance.   HENT:      Head: Normocephalic and atraumatic.   Cardiovascular:      Rate and Rhythm: Normal rate and regular rhythm.   Pulmonary:      Effort: Pulmonary effort is normal.      Breath sounds: Normal breath sounds.   Abdominal:      General: Bowel sounds are normal.      Palpations: Abdomen is soft.   Musculoskeletal:         General: No swelling or tenderness.   Skin:     General: Skin is warm and dry.   Neurological:      General: No focal deficit present.      Mental Status: She is alert and oriented to person, place, and time.   Psychiatric:         Mood and Affect: Mood normal.         Behavior: Behavior normal.         Debilities/Disabilities Identified: None    Emotional Behavior: Appropriate      /60   Pulse 73   Ht 152.4 cm (60\")   Wt 55.3 kg (122 lb)   SpO2 96%   BMI 23.83 kg/m²         Diagnoses and all orders for this visit:    1. Oropharyngeal dysphagia (Primary)    2. Olivier's esophagus without dysplasia    3. Colon cancer screening    We will get Tracie scheduled for an EGD, colonoscopy, and swallow study.  I discussed with her the benefits and risks of performing endoscopy.  Benefits and risks were not limited to but including bleeding, infection, perforation, complications of anesthesia, aspiration.  She appeared to understand and is willing to proceed.    Thank you for allowing me to participate in the care of this interesting patient.              "

## 2023-01-16 NOTE — PATIENT INSTRUCTIONS
IF YOU SMOKE OR USE TOBACCO PLEASE READ THE FOLLOWING:  Why is smoking bad for me?  Smoking increases the risk of heart disease, lung disease, vascular disease, stroke, and cancer. If you smoke, STOP!    For more information:  Quit Now [place]  1-800-QUIT-NOW  https://[place].quitlogix.org/en-[place]/    Steps to Quit Smoking  Smoking tobacco is the leading cause of preventable death. It can affect almost every organ in the body. Smoking puts you and those around you at risk for developing many serious chronic diseases. Quitting smoking can be difficult, but it is one of the best things that you can do for your health. It is never too late to quit.  How do I get ready to quit?  When you decide to quit smoking, create a plan to help you succeed. Before you quit:  • Pick a date to quit. Set a date within the next 2 weeks to give you time to prepare.  • Write down the reasons why you are quitting. Keep this list in places where you will see it often.  • Tell your family, friends, and co-workers that you are quitting. Support from your loved ones can make quitting easier.  • Talk with your health care provider about your options for quitting smoking.  • Find out what treatment options are covered by your health insurance.  • Identify people, places, things, and activities that make you want to smoke (triggers). Avoid them.  What first steps can I take to quit smoking?  • Throw away all cigarettes at home, at work, and in your car.  • Throw away smoking accessories, such as ashtrays and lighters.  • Clean your car. Make sure to empty the ashtray.  • Clean your home, including curtains and carpets.  What strategies can I use to quit smoking?  Talk with your health care provider about combining strategies, such as taking medicines while you are also receiving in-person counseling. Using these two strategies together makes you more likely to succeed in quitting than if you used either strategy on its own.  • If you are  pregnant or breastfeeding, talk with your health care provider about finding counseling or other support strategies to quit smoking. Do not take medicine to help you quit smoking unless your health care provider tells you to do so.  To quit smoking:  Quit right away  • Quit smoking completely, instead of gradually reducing how much you smoke over a period of time. Research shows that stopping smoking right away is more successful than gradually quitting.  • Attend in-person counseling to help you build problem-solving skills. You are more likely to succeed in quitting if you attend counseling sessions regularly. Even short sessions of 10 minutes can be effective.  Take medicine  You may take medicines to help you quit smoking. Some medicines require a prescription and some you can purchase over-the-counter. Medicines may have nicotine in them to replace the nicotine in cigarettes. Medicines may:  • Help to stop cravings.  • Help to relieve withdrawal symptoms.  Your health care provider may recommend:  • Nicotine patches, gum, or lozenges.  • Nicotine inhalers or sprays.  • Non-nicotine medicine that is taken by mouth.  Find resources  Find resources and support systems that can help you to quit smoking and remain smoke-free after you quit. These resources are most helpful when you use them often. They include:  • Online chats with a counselor.  • Telephone quitlines.  • Printed self-help materials.  • Support groups or group counseling.  • Text messaging programs.  • Mobile phone apps or applications. Use apps that can help you stick to your quit plan by providing reminders, tips, and encouragement. There are many free apps for mobile devices as well as websites. Examples include Quit Guide from the CDC and smokefree.gov  What things can I do to make it easier to quit?    • Reach out to your family and friends for support and encouragement. Call telephone quitlines (8-800-QUIT-NOW), reach out to support groups, or  work with a counselor for support.  • Ask people who smoke to avoid smoking around you.  • Avoid places that trigger you to smoke, such as bars, parties, or smoke-break areas at work.  • Spend time with people who do not smoke.  • Lessen the stress in your life. Stress can be a smoking trigger for some people. To lessen stress, try:  ? Exercising regularly.  ? Doing deep-breathing exercises.  ? Doing yoga.  ? Meditating.  ? Performing a body scan. This involves closing your eyes, scanning your body from head to toe, and noticing which parts of your body are particularly tense. Try to relax the muscles in those areas.  How will I feel when I quit smoking?  Day 1 to 3 weeks  Within the first 24 hours of quitting smoking, you may start to feel withdrawal symptoms. These symptoms are usually most noticeable 2-3 days after quitting, but they usually do not last for more than 2-3 weeks. You may experience these symptoms:  • Mood swings.  • Restlessness, anxiety, or irritability.  • Trouble concentrating.  • Dizziness.  • Strong cravings for sugary foods and nicotine.  • Mild weight gain.  • Constipation.  • Nausea.  • Coughing or a sore throat.  • Changes in how the medicines that you take for unrelated issues work in your body.  • Depression.  • Trouble sleeping (insomnia).  Week 3 and afterward  After the first 2-3 weeks of quitting, you may start to notice more positive results, such as:  • Improved sense of smell and taste.  • Decreased coughing and sore throat.  • Slower heart rate.  • Lower blood pressure.  • Clearer skin.  • The ability to breathe more easily.  • Fewer sick days.  Quitting smoking can be very challenging. Do not get discouraged if you are not successful the first time. Some people need to make many attempts to quit before they achieve long-term success. Do your best to stick to your quit plan, and talk with your health care provider if you have any questions or concerns.  Summary  • Smoking tobacco  is the leading cause of preventable death. Quitting smoking is one of the best things that you can do for your health.  • When you decide to quit smoking, create a plan to help you succeed.  • Quit smoking right away, not slowly over a period of time.  • When you start quitting, seek help from your health care provider, family, or friends.  This information is not intended to replace advice given to you by your health care provider. Make sure you discuss any questions you have with your health care provider.  Document Revised: 08/26/2022 Document Reviewed: 03/07/2020  Elsevier Patient Education © 2022 [employer]

## 2023-01-16 NOTE — PROGRESS NOTES
Milan RIOS Guzman 67 y.o. female presents @ the req of Dr. Starr for eval of diff swallowing solids and liquids.   Chief Complaint   Patient presents with   • Difficulty Swallowing             HPI   Above noted and agree.  This very pleasant 67-year-old female is known to my service.  I performed a laparoscopic cholecystectomy on her.  She presents today with difficulty swallowing.  She says she is not sure what will happen.  She will swallow some food and then she will take a drink and unable to swallow it.  She had a colonoscopy 10 years ago.  She says she believes it was normal.  She has had upper scopes in the past and has been diagnosed with Olivier's esophagus as well as candidiasis of the esophagus.  She does still smoke cigarettes but she has cut back.  She moves her bowels daily and does not have any abdominal pain.  She has never had a swallow study.  She has no chest pain or shortness of breath.  She has no fevers or chills.  She has no other complaints.      Review of Systems   All other systems reviewed and are negative.            Current Outpatient Medications:   •  budesonide-formoterol (SYMBICORT) 160-4.5 MCG/ACT inhaler, Inhale 2 puffs Daily., Disp: , Rfl:   •  Calcium 200 MG tablet, Take  by mouth., Disp: , Rfl:   •  diphenhydrAMINE-APAP, sleep, (TYLENOL PM EXTRA STRENGTH PO), Take  by mouth., Disp: , Rfl:   •  Fluticasone-Umeclidin-Vilant (TRELEGY ELLIPTA IN), Inhale., Disp: , Rfl:   •  lisinopril (PRINIVIL,ZESTRIL) 10 MG tablet, Take 20 mg by mouth Daily., Disp: , Rfl:   •  montelukast (SINGULAIR) 10 MG tablet, Take 10 mg by mouth Every Night., Disp: , Rfl:   •  omeprazole (priLOSEC) 20 MG capsule, Take 40 mg by mouth Daily., Disp: , Rfl:   •  oxyCODONE (OXYCONTIN) 10 MG 12 hr tablet, Take 10 mg by mouth 3 (Three) Times a Day As Needed for Severe Pain ., Disp: , Rfl:   •  pregabalin (LYRICA) 75 MG capsule, Take 75 mg by mouth 3 (Three) Times a Day., Disp: , Rfl:   •  rosuvastatin (CRESTOR) 20 MG  tablet, Take 20 mg by mouth Daily., Disp: , Rfl:         Allergies   Allergen Reactions   • No Known Drug Allergy            Past Medical History:   Diagnosis Date   • Anxiety    • Arthritis    • Back pain    • Carotid artery stenosis    • Cataract    • Chronic pain     has had epidural injections, goes to [facility]   • COPD (chronic obstructive pulmonary disease) (CMS/HCC)    • Diverticular disease    • Dysphagia     history of   • Failed back syndrome, lumbar     failed fusion   • Frequent falls     history of, none recently   • Gallstones     sched lap buck   • GERD (gastroesophageal reflux disease)    • History of transfusion    • Hypertension    • IBS (irritable bowel syndrome)     history of   • Kidney scarring     right   • Murmur    • Sciatic pain     bilateral   • Vitamin D deficiency            Past Surgical History:   Procedure Laterality Date   • BACK SURGERY      lumbar fusion   • BLADDER SURGERY      bladder lift   • BUNIONECTOMY Left 2016    Procedure: LAPIDUS BUNIONECTOMY LEFT FOOT;  Surgeon: Raphael Storm DPM;  Location: [facility] OR;  Service:    •  SECTION      x2   • CHOLECYSTECTOMY WITH INTRAOPERATIVE CHOLANGIOGRAM N/A 2019    Procedure: CHOLECYSTECTOMY LAPAROSCOPIC INTRAOPERATIVE CHOLANGIOGRAM;  Surgeon: Deb Perez DO;  Location: [facility] OR;  Service: General   • COLONOSCOPY  2013    normal per pt.   • ENDOSCOPY N/A 2017    Procedure: ESOPHAGOGASTRODUODENOSCOPY w/ biopsies;  Surgeon: Garth Morales MD;  Location: [facility] OR;  Service:    • HYSTERECTOMY     • INSERTION / REMOVAL EPIDURAL SPINAL NEUROSTIMULATOR      removed   • NECK SURGERY      C6C7 fusion & C7C8 fusion   • REPLACEMENT TOTAL KNEE Left            Social History     Tobacco Use   • Smoking status: Every Day     Packs/day: 1.00     Years: 45.00     Pack years: 45.00     Types: Cigarettes   • Smokeless tobacco: Never   • Tobacco comments:     admits to smoking since age 16 1ppd   "  Vaping Use   • Vaping Use: Never used   Substance Use Topics   • Alcohol use: No   • Drug use: No             There is no immunization history on file for this patient.        Physical Exam  Vitals and nursing note reviewed.   Constitutional:       Appearance: Normal appearance.   HENT:      Head: Normocephalic and atraumatic.   Cardiovascular:      Rate and Rhythm: Normal rate and regular rhythm.   Pulmonary:      Effort: Pulmonary effort is normal.      Breath sounds: Normal breath sounds.   Abdominal:      General: Bowel sounds are normal.      Palpations: Abdomen is soft.   Musculoskeletal:         General: No swelling or tenderness.   Skin:     General: Skin is warm and dry.   Neurological:      General: No focal deficit present.      Mental Status: She is alert and oriented to person, place, and time.   Psychiatric:         Mood and Affect: Mood normal.         Behavior: Behavior normal.         Debilities/Disabilities Identified: None    Emotional Behavior: Appropriate      /60   Pulse 73   Ht 152.4 cm (60\")   Wt 55.3 kg (122 lb)   SpO2 96%   BMI 23.83 kg/m²         Diagnoses and all orders for this visit:    1. Oropharyngeal dysphagia (Primary)    2. Olivier's esophagus without dysplasia    3. Colon cancer screening    We will get Tracie scheduled for an EGD, colonoscopy, and swallow study.  I discussed with her the benefits and risks of performing endoscopy.  Benefits and risks were not limited to but including bleeding, infection, perforation, complications of anesthesia, aspiration.  She appeared to understand and is willing to proceed.    Thank you for allowing me to participate in the care of this interesting patient.          "

## 2023-01-17 ENCOUNTER — TELEPHONE (OUTPATIENT)
Dept: SURGERY | Facility: CLINIC | Age: 68
End: 2023-01-17
Payer: MEDICARE

## 2023-01-17 DIAGNOSIS — R13.12 OROPHARYNGEAL DYSPHAGIA: Primary | ICD-10-CM

## 2023-01-19 ENCOUNTER — TELEPHONE (OUTPATIENT)
Dept: SURGERY | Facility: CLINIC | Age: 68
End: 2023-01-19
Payer: MEDICARE

## 2023-02-03 ENCOUNTER — TELEPHONE (OUTPATIENT)
Dept: SURGERY | Facility: CLINIC | Age: 68
End: 2023-02-03
Payer: MEDICARE

## 2023-02-03 NOTE — TELEPHONE ENCOUNTER
"Ret pt's call and she reports she would rather \"do the mail in test and if something is wrong she will sched c-scope.\"  Discussed with pt that Dr. Perez prefers her pts to have actual c-scope as she feels like this is a better test.  Pt still declines and says she only wants EGD.      SG SCHED notified.   "

## 2023-02-03 NOTE — TELEPHONE ENCOUNTER
Caller: JUAN DIEGOISAEL SAHU    Best call back number: [phone]    Patient is needing: PT ASKED TO SPEAK TO EDITH. PT HAS EGD/COLONOSCOPY SCHEDULED WITH DR. RENDON FOR 2/7. SHE'D LIKE TO ONLY DO THE EGD AND NOT COLONOSCOPY AT THIS TIME. UNABLE TO WT.

## 2023-02-06 ENCOUNTER — ANESTHESIA EVENT (OUTPATIENT)
Dept: PERIOP | Facility: HOSPITAL | Age: 68
End: 2023-02-06
Payer: MEDICARE

## 2023-02-07 ENCOUNTER — ANESTHESIA (OUTPATIENT)
Dept: PERIOP | Facility: HOSPITAL | Age: 68
End: 2023-02-07
Payer: MEDICARE

## 2023-02-07 ENCOUNTER — TELEPHONE (OUTPATIENT)
Dept: SURGERY | Facility: CLINIC | Age: 68
End: 2023-02-07
Payer: MEDICARE

## 2023-02-07 NOTE — TELEPHONE ENCOUNTER
Pt called to discuss EGD stating she has been very sick and in the hospital.  She thought her EGD was sched on 02/13.  After rev'ing pt's chart it was discuvered that she actually has another test sched on that date.  Rev'd all phone messages with pt along with EGD sched today.  Pt states she just got confused and req to resched EGD.  Pt resched ----> 02/14/2022 @ 11:30 am with 10:30 am arrival time.  SG SCHED notified,

## 2023-02-13 ENCOUNTER — HOSPITAL ENCOUNTER (OUTPATIENT)
Dept: GENERAL RADIOLOGY | Facility: HOSPITAL | Age: 68
Discharge: HOME | End: 2023-02-13
Admitting: SURGERY
Payer: MEDICARE

## 2023-02-13 DIAGNOSIS — R13.12 OROPHARYNGEAL DYSPHAGIA: ICD-10-CM

## 2023-02-13 PROCEDURE — 74230 X-RAY XM SWLNG FUNCJ C+: CPT

## 2023-02-13 PROCEDURE — 92611 MOTION FLUOROSCOPY/SWALLOW: CPT

## 2023-02-13 NOTE — MBS/VFSS/FEES
Outpatient Speech Language Pathology   Adult Swallow Initial Evaluation   Modified Barium Swallow Study  CHANCE Otoole     Patient Name: Milan Hinds  : 1955  MRN: [id number]  Today's Date: 2023         Visit Date: 2023   Patient Active Problem List   Diagnosis   • Anxiety   • Chronic back pain   • Chronic obstructive pulmonary disease (HCC)   • Gastroesophageal reflux disease   • Hypertension   • Lumbar radiculopathy   • Pain in thoracic spine   • Headache following lumbar puncture   • Vitamin D deficiency   • Smoker   • Onychomycosis   • Routine health maintenance   • Neck pain   • Allergic rhinitis   • Gastric nodule   • Hypercholesterolemia   • Oropharyngeal dysphagia   • Olivier's esophagus without dysplasia        Past Medical History:   Diagnosis Date   • Anxiety    • Arthritis    • Back pain    • Carotid artery stenosis    • Cataract    • Chronic pain     has had epidural injections, goes to [facility]   • COPD (chronic obstructive pulmonary disease) (HCC)    • Diverticular disease    • Dysphagia     history of   • Failed back syndrome, lumbar     failed fusion   • Frequent falls     history of, none recently   • Gallstones     sched lap buck   • GERD (gastroesophageal reflux disease)    • History of transfusion    • Hypertension    • IBS (irritable bowel syndrome)     history of   • Kidney scarring     right   • Murmur    • Sciatic pain     bilateral   • Vitamin D deficiency         Past Surgical History:   Procedure Laterality Date   • BACK SURGERY      lumbar fusion   • BLADDER SURGERY      bladder lift   • BUNIONECTOMY Left 2016    Procedure: LAPIDUS BUNIONECTOMY LEFT FOOT;  Surgeon: Raphael Storm DPM;  Location: [facility] OR;  Service:    •  SECTION      x2   • CHOLECYSTECTOMY WITH INTRAOPERATIVE CHOLANGIOGRAM N/A 2019    Procedure: CHOLECYSTECTOMY LAPAROSCOPIC INTRAOPERATIVE CHOLANGIOGRAM;  Surgeon: Deb Perez DO;  Location: [facility] OR;  Service:  General   • COLONOSCOPY  2013    normal per pt.   • ENDOSCOPY N/A 8/16/2017    Procedure: ESOPHAGOGASTRODUODENOSCOPY w/ biopsies;  Surgeon: Garth Morales MD;  Location: [facility] OR;  Service:    • HYSTERECTOMY     • INSERTION / REMOVAL EPIDURAL SPINAL NEUROSTIMULATOR      removed   • NECK SURGERY      C6C7 fusion & C7C8 fusion   • REPLACEMENT TOTAL KNEE Left          Visit Dx:     ICD-10-CM ICD-9-CM   1. Oropharyngeal dysphagia  R13.12 787.22            OP SLP Assessment/Plan - 02/13/23 1030        SLP Assessment    Functional Problems Swallowing  -AD    Impact on Function: Swallowing Risk of aspiration  -AD    Clinical Impression: Swallowing Mild:;oropharyngeal phase dysphagia   suspected esophageal dysphagia -AD    Functional Problems Comment Reports choking on meats and difficulty getting food to go down at times. Also reports liquid wash not effective in aiding with transit of solids.  -AD    Clinical Impression Comments Decreased oral transit and delay of swallow. Trace penetration of thins, nectar and honey during the swallow. No aspiration. PRN throat clear and prepping successful.  -AD    Please refer to paper survey for additional self-reported information No  -AD    Please refer to items scanned into chart for additional diagnostic informaiton and handouts as provided by clinician No  -AD    SLP Diagnosis Mild oropharyngeal dysphagia and suspected esophageal dysphagia.  -AD    Prognosis Good (comment)   cooperative nature, motivation and ability to demonstrate use of strategies -AD    Patient/caregiver participated in establishment of treatment plan and goals Yes  -AD    Patient would benefit from skilled therapy intervention Yes  -AD       SLP Plan    Frequency 4 visits  -AD    Duration 1-2 months  -AD    Planned CPT's? SLP SWALLOW THERAPY: 68595  -AD    Expected Duration of Therapy Session (SLP Eval) 30  -AD    Plan Comments Will contact provide and pt regarding orders and f/u scheduling.  -AD           User Key  (r) = Recorded By, (t) = Taken By, (c) = Cosigned By    Initials Name Provider Type    AD Radha Torres MS CCC-SLP Speech and Language Pathologist                 SLP Adult Swallow Evaluation     Row Name 02/13/23 2176       Rehab Evaluation    Document Type evaluation  -AD    Subjective Information no complaints  -AD    Patient Observations alert;cooperative  -AD    Patient/Family/Caregiver Comments/Observations Pt seen in radiology for video swallow study along with radiologist and rad tech.  -AD    Patient Effort good  -AD    Symptoms Noted During/After Treatment none  -AD       General Information    Patient Profile Reviewed yes  -AD    Pertinent History Of Current Problem Pt is a 76 y/o female referred for a video swallowing study by Dr. Perez.  She reports a history of recent dysphagia with difficulty swallowing at times. She reports she has gotten choked on duggan and bread in past and when she encounters this difficulty, she cannot even get a drink of water down. Hx is significant for prior GERD for which pt states is controlled with current medications. She has a diagnosis of Olivier's esophagus as well and at least a history of candida of the esophagus. Current smoker. C-spine surgery w/most recent approximately 2 years ago. Hardware at C6-7 and C7-8. Hx of COPD as well with no recent issues reported.  -AD    Current Method of Nutrition soft to chew textures;whole;thin liquids  Pt choice. Avoids tough meats, roast beef and breads per report.  -AD    Precautions/Limitations, Vision WFL  -AD    Precautions/Limitations, Hearing WFL  -AD    Prior Level of Function-Communication WFL  -AD    Prior Level of Function-Swallowing no diet consistency restrictions;esophageal concerns  hx of Olivier's, GERD and IBS  -AD    Plans/Goals Discussed with patient;agreed upon  -AD    Barriers to Rehab none identified  -AD    Patient's Goals for Discharge eat/drink without coughing/choking  -AD        Pain    Additional Documentation --  No current pain reported or indicated.  -AD       Oral Motor Structure and Function    Oral Lesions or Structural Abnormalities and/or variants WFL  -AD    Dentition Assessment natural, present and adequate;missing teeth  -AD    Secretion Management WNL/WFL  -AD    Mucosal Quality moist, healthy  -AD       Oral Musculature and Cranial Nerve Assessment    Oral Motor General Assessment WFL  -AD    Oral Motor, Comment No deficits of asymmetry, ROM or strength noted.  -AD       General Eating/Swallowing Observations    Respiratory Support Currently in Use room air  -AD    Positioning During Eating other (see comments)  standing, left lateral position  -AD       Respiratory    Respiratory Status WFL;room air;during swallowing/eating  -AD       MBS/VFSS    Utensils Used spoon;cup;straw  -AD    Consistencies Trialed regular textures;pureed;thin liquids;nectar/syrup-thick liquids;honey-thick liquids  -AD       MBS/VFSS Interpretation    Oral Prep Phase WFL;other (see comments)  -AD    Oral Transit Phase impaired  -AD    Oral Residue WFL  -AD    VFSS Summary Pt presents with a mild oropharyngeal and suspected esophageal dysphagia. Pt with decreased oral control of liquids and oral transit issues. Decreased bolus hold with tongue pumping noted on HTL. Pt with spill of all consistencies except solids to the level of the valleculae and thins to the level of the pyriforms prior to the swallow. Tongue pumping noted on trials of honey thick liquids only. Pharyngeal phase marked by delay of swallow w/material in the pharynx prior to the swallow. This results in penetration of thins, nectar and honey thick during the swallow. Transient in nature on spoon size trials of nectar and trace coating on nectar from large cup drink, honey thick and thins. Able to clear with cued throat clear. No significant residue after the swallow. Pt able to demonstrate prepping with improved bolus control, but  still with some spill on thins from straw. Unable to demonstrate effortful swallow during study, but able to verbalize and demonstrate prn throat clear which was effective. Esophageal phase noted with slow transit past C-spine hardware on solids. Did not assess esophageal phase otherwise.  -AD    Oral Phase, Comment Pt with mild deficits in oral transit. Oral prep overall WFL. Increased prep of solid foods due to several missing molars. Otherwise, prep was WFL and no oral deficits to cause difficulty with mastication. Oral transit was characterized by inconsistent tongue pumping noted on honey thick liquid trials only, piecemeal oral transit of nectar thick liquids on large cup drink. Premature spill of thins noted on one trial from the cup and consistent spill of all consistencies but solids to the level of the valleculae and thins from spoon to the level of the pyriforms. Trace coating of honey thick liquids on the surface of the base of the tongue.  -AD       Oral Transit Phase    Impaired Oral Transit Phase tongue pumping;piecemeal oral transit;premature spillage of liquids into pharynx  -AD    Tongue Pumping honey-thick liquids;secondary to reduced lingual control  -AD    Piecemeal Oral Transit nectar-thick liquids;secondary to reduced lingual control;other (see comments)  cup only  -AD    Premature Spillage of Liquids into Pharynx thin liquids;secondary to reduced lingual control;other (see comments)  premature spill on 1 of 2 spoon size trials and consistent spill to the valleculae on all but solids and thins to the level of the pyriforms as well  -AD       Initiation of Pharyngeal Swallow    Initiation of Pharyngeal Swallow bolus in valleculae;bolus in pyriform sinuses  -AD    Pharyngeal Phase impaired pharyngeal phase of swallowing  -AD    Anatomical abnormalities noted c-spine hardware  C6-7 and C7-8 per hx  -AD    Anatomical abnormalities functional impact other (see comments)  noted slow transit of  solids around C-spine hardware  -AD    Penetration During the Swallow thin liquids;nectar-thick liquids;honey-thick liquids;secondary to delayed swallow initiation or mistiming  -AD    Depth of Penetration deep;shallow;transient;other (see comments)  shallow on NTL by spoon and spont expelled; shallow on NTL by cup and HTL by spoon; moderate depth by thins by spoon and deep to just above cords on thins from cup  -AD    Response to Penetration No  -AD    No spontaneous response to penetration and effective laryngeal clearance with cue (see comments)  cued throat clear  -AD    Rosenbek's Scale thin:;nectar:;honey:;3--->level 3  -AD    Pharyngeal Residue nectar-thick liquids;valleculae;thin liquids;laryngeal vestibule;secondary to reduced base of tongue retraction  -AD    Response to Residue cleared residue with spontaneous subsequent swallow;cleared residue with compensatory maneuver (see comments)  cleared vestibular residue with cued throat clear  -AD    Attempted Compensatory Maneuvers bolus size;bolus presentation style;effortful (hard swallow);throat clear after swallow;other (see comments)  3 second prep/bolus hold, effortful swallow not effective. Prepping did decrease spill/pooling in valleculae and no spilling in pyriform on thins; unable to adequately perform effortful swallow; PRN throat clear effective in clearing vestibule.  -AD    Response to Attempted Compensatory Maneuvers prevented aspiration;reduced residue  -AD    Successful Compensatory Maneuver Competency patient able to;demonstrate compensations;with cues  use of prn throat clear;  -AD    Pharyngeal Phase, Comment Pt demonstrates a mild pharyngeal dysphagia with most notable deficits of delay/mistiming of swallow with material pooling in the pharynx prior to the pharyngeal swallow. Penetration occurs during the swallow and is greater with larger bolus size. Transient penetration noted on initial trials of nectar thick by spoon. Trace penetration  noted on cup drinks of nectar, spoon size trials of honey thick and thins by spoon,cup and straw. Decreased vestibular closure during the height of the swallow results in posterior penetration. Pt does not sense material but is able to clear with consistent cued throat clear. Penetration lessens with small bolus size but is not eliminated. Prepping prior to swallow/bolus hold, did decrease amount of spill, but did not eliminate completely. Pt was unable to adequately perform an efforful swallow for improved vestibular closure. No aspiration or significant residue was noted during the study.  -AD       Esophageal Phase    Esophageal Phase other (see comments)  -AD    Esophageal Phase, Comment Noted slow transit of solid around C-spine hardware.  -AD       SLP Evaluation Clinical Impression    SLP Swallowing Diagnosis mild;oral dysphagia;pharyngeal dysphagia;suspected esophageal dysphagia  -AD    Functional Impact other  minimal risk of aspiration  -AD    Rehab Potential/Prognosis, Swallowing good, to achieve stated therapy goals  -AD    Swallow Criteria for Skilled Therapeutic Interventions Met other (see comments)  HEP for improved oral control and transit.  -AD       Recommendations    Therapy Frequency (Swallow) other (see comments)  4 vists  -AD    Predicted Duration Therapy Intervention (Days) other (see comments)  2 month  -AD    SLP Diet Recommendation soft to chew textures;whole;thin liquids  -AD    Recommended Precautions and Strategies upright posture during/after eating;small bites of food and sips of liquid;volitional throat clear;general aspiration precautions;reflux precautions  -AD    Oral Care Recommendations Oral Care before breakfast, after meals and PRN;Toothbrush  -AD    SLP Rec. for Method of Medication Administration meds whole;with thin liquids;as tolerated  -AD    Monitor for Signs of Aspiration no;notify SLP if any concerns  -AD    Demonstrates Need for Referral to Another Service dedicated  esophageal assessment;other (see comments)  scheduled for EGD tomorrow  -AD          User Key  (r) = Recorded By, (t) = Taken By, (c) = Cosigned By    Initials Name Provider Type    Radha Tolbert MS CCC-SLP Speech and Language Pathologist                   OP SLP Education     Row Name 02/13/23 1030       Education    Barriers to Learning No barriers identified  -AD    Education Provided Patient expressed understanding of evaluation;Patient demonstrated recommended strategies;Patient requires further education on strategies, risks  -AD    Assessed Learning needs;Learning motivation;Learning preferences;Learning readiness  -AD    Learning Motivation Strong  Pt  -AD    Learning Method Explanation;Demonstration  -AD    Teaching Response Verbalized understanding;Demonstrated understanding  -AD    Education Comments Video images reviewed along with results and recommendation.  -AD          User Key  (r) = Recorded By, (t) = Taken By, (c) = Cosigned By    Initials Name Effective Dates    Radha Tolbert MS CCC-SLP 06/16/21 -                SLP OP Goals     Row Name 02/13/23 1030          Goal Type Needed    Goal Type Needed Dysphagia;Other Adult Goals  -AD        Subjective Comments    Subjective Comments Pt seen for video swallow study.  -AD        Subjective Pain    Able to rate subjective pain? yes  -AD     Subjective Pain Comment No pain reported.  -AD        Dysphagia Goals    Dysphagia LTG's Patient will safely consume the recommended diet without complications such as aspiration pneumonia  -AD     Patient will safely consume the recommended diet without complications such as aspiration pneumonia soft to chew, whole foods  -AD     Status: Patient will safely consume the recommended diet without complications such as aspiration pneumonia New  -AD     Dysphagia STG's Patient will compensate for oral/pharyngeal deficits and reduce risks while eating by utilizing  compensatory strategies;Patient will  improve stage transition duration of swallow/improve timing to reduce food falling into the airway by decreasing swallow delay after neurosensory  stimulation;Patient will improve oral skills to enhance safety and increase eating efficiency by increasing accuracy of A-P tongue movements;Patient will improve oral skills to enhance safety and increase eating efficiency by increasing accuracy of back of tongue control  -AD     Patient will improve oral skills to enhance safety and increase eating efficiency by increasing accuracy of A-P tongue movements without cues  -AD     Status: Patient will improve oral skills to enhance safety and increase eating efficiency by increasing accuracy of A-P tongue movements New  -AD     Patient will improve oral skills to enhance safety and increase eating efficiency by increasing accuracy of back of tongue control without cues  -AD     Status: Patient will improve oral skills to enhance safety and increase eating efficiency by increasing accuracy of back of tongue control New  -AD     Patient will improve stage transition duration of swallow/improve timing to reduce food falling into the airway by decreasing swallow delay after neurosensory  stimulation three second prep;without cues  -AD     Status: Patient will improve stage transition duration of swallow/improve timing to reduce food falling into the airway by decreasing swallow delay after neurosensory  stimulation New  -AD     Patient will compensate for oral/pharyngeal deficits and reduce risks while eating by utilizing  compensatory strategies controlled bolus size;without cues  prn throat clear  -AD     Status: Patient will compensate for oral/pharyngeal deficits and reduce risks while eating by utilizing  compensatory strategies New  -AD        Other Goals    Other Adult Goal- 1 Pt will demonstrate independence with HEP provided for improve oral control and transit to increased bolus control and eating efficiency in 2  weeks.  -AD     Status: Other Adult Goal- 1 New  -AD        SLP Time Calculation    SLP Goal Re-Cert Due Date 04/13/23  -AD           User Key  (r) = Recorded By, (t) = Taken By, (c) = Cosigned By    Initials Name Provider Type    AD Radha Torres, MS CCC-SLP Speech and Language Pathologist                       Time Calculation:   SLP Start Time: 1030  SLP Stop Time: 1155  SLP Time Calculation (min): 85 min  SLP Non-Billable Time (min): 0 min  Total Timed Code Minutes- SLP: 0 minute(s)  Untimed Charges  SLP Eval/Re-eval : ST Motion Fluoro Eval Swallow - 92976  [id number] Motion Fluoro Eval Swallow Minutes: 85  Total Minutes  Untimed Charges Total Minutes: 85   Total Minutes: 85    Therapy Charges for Today     Code Description Service Date Service Provider Modifiers Qty    [id number]  ST MOTION FLUORO EVAL SWALLOW 6 2/13/2023 Radha Torres MS CCC-SLP GN 1                   Radha Torres MS CCC-SLP  2/13/2023

## 2023-02-14 ENCOUNTER — HOSPITAL ENCOUNTER (OUTPATIENT)
Facility: HOSPITAL | Age: 68
Setting detail: HOSPITAL OUTPATIENT SURGERY
Discharge: HOME | End: 2023-02-14
Attending: SURGERY | Admitting: SURGERY
Payer: MEDICARE

## 2023-02-14 VITALS
OXYGEN SATURATION: 96 % | HEART RATE: 74 BPM | SYSTOLIC BLOOD PRESSURE: 146 MMHG | WEIGHT: 115.6 LBS | BODY MASS INDEX: 22.58 KG/M2 | DIASTOLIC BLOOD PRESSURE: 83 MMHG | RESPIRATION RATE: 14 BRPM | TEMPERATURE: 97.2 F

## 2023-02-14 DIAGNOSIS — R13.12 OROPHARYNGEAL DYSPHAGIA: ICD-10-CM

## 2023-02-14 DIAGNOSIS — K22.70 BARRETT'S ESOPHAGUS WITHOUT DYSPLASIA: ICD-10-CM

## 2023-02-14 PROCEDURE — 88305 TISSUE EXAM BY PATHOLOGIST: CPT | Performed by: SURGERY

## 2023-02-14 PROCEDURE — 87081 CULTURE SCREEN ONLY: CPT | Performed by: SURGERY

## 2023-02-14 PROCEDURE — 25010000002 PROPOFOL 200 MG/20ML EMULSION: Performed by: NURSE ANESTHETIST, CERTIFIED REGISTERED

## 2023-02-14 PROCEDURE — 43239 EGD BIOPSY SINGLE/MULTIPLE: CPT | Performed by: SURGERY

## 2023-02-14 RX ORDER — FLUCONAZOLE 100 MG/1
200 TABLET ORAL DAILY
Qty: 30 TABLET | Refills: 0 | Status: SHIPPED | OUTPATIENT
Start: 2023-02-14 | End: 2023-03-07

## 2023-02-14 RX ORDER — LIDOCAINE HYDROCHLORIDE 10 MG/ML
0.5 INJECTION, SOLUTION EPIDURAL; INFILTRATION; INTRACAUDAL; PERINEURAL ONCE AS NEEDED
Status: DISCONTINUED | OUTPATIENT
Start: 2023-02-14 | End: 2023-02-14 | Stop reason: HOSPADM

## 2023-02-14 RX ORDER — SODIUM CHLORIDE 9 MG/ML
40 INJECTION, SOLUTION INTRAVENOUS AS NEEDED
Status: DISCONTINUED | OUTPATIENT
Start: 2023-02-14 | End: 2023-02-14 | Stop reason: HOSPADM

## 2023-02-14 RX ORDER — SODIUM CHLORIDE 0.9 % (FLUSH) 0.9 %
10 SYRINGE (ML) INJECTION AS NEEDED
Status: DISCONTINUED | OUTPATIENT
Start: 2023-02-14 | End: 2023-02-14 | Stop reason: HOSPADM

## 2023-02-14 RX ORDER — LIDOCAINE HYDROCHLORIDE 20 MG/ML
INJECTION, SOLUTION INFILTRATION; PERINEURAL AS NEEDED
Status: DISCONTINUED | OUTPATIENT
Start: 2023-02-14 | End: 2023-02-14 | Stop reason: SURG

## 2023-02-14 RX ORDER — SODIUM CHLORIDE, SODIUM LACTATE, POTASSIUM CHLORIDE, CALCIUM CHLORIDE 600; 310; 30; 20 MG/100ML; MG/100ML; MG/100ML; MG/100ML
9 INJECTION, SOLUTION INTRAVENOUS CONTINUOUS
Status: DISCONTINUED | OUTPATIENT
Start: 2023-02-14 | End: 2023-02-14 | Stop reason: HOSPADM

## 2023-02-14 RX ORDER — MAGNESIUM HYDROXIDE 1200 MG/15ML
LIQUID ORAL AS NEEDED
Status: DISCONTINUED | OUTPATIENT
Start: 2023-02-14 | End: 2023-02-14 | Stop reason: HOSPADM

## 2023-02-14 RX ORDER — PROPOFOL 10 MG/ML
INJECTION, EMULSION INTRAVENOUS AS NEEDED
Status: DISCONTINUED | OUTPATIENT
Start: 2023-02-14 | End: 2023-02-14 | Stop reason: SURG

## 2023-02-14 RX ORDER — SODIUM CHLORIDE 0.9 % (FLUSH) 0.9 %
10 SYRINGE (ML) INJECTION EVERY 12 HOURS SCHEDULED
Status: DISCONTINUED | OUTPATIENT
Start: 2023-02-14 | End: 2023-02-14 | Stop reason: HOSPADM

## 2023-02-14 RX ADMIN — LIDOCAINE HYDROCHLORIDE 100 MG: 20 INJECTION, SOLUTION INFILTRATION; PERINEURAL at 12:11

## 2023-02-14 RX ADMIN — PROPOFOL INJECTABLE EMULSION 150 MG: 10 INJECTION, EMULSION INTRAVENOUS at 12:11

## 2023-02-14 RX ADMIN — SODIUM CHLORIDE, POTASSIUM CHLORIDE, SODIUM LACTATE AND CALCIUM CHLORIDE 9 ML/HR: 600; 310; 30; 20 INJECTION, SOLUTION INTRAVENOUS at 10:45

## 2023-02-14 NOTE — ANESTHESIA POSTPROCEDURE EVALUATION
Patient: Milan Hinds    Procedure Summary     Date: 02/14/23 Room / Location: [facility] ENDOSCOPY 1 /  [facility] OR    Anesthesia Start: 1206 Anesthesia Stop: 1223    Procedure: Esophagogastroduodenoscopy with biopsy (Esophagus) Diagnosis:       Oropharyngeal dysphagia      Olivier's esophagus without dysplasia      Colon cancer screening      (Oropharyngeal dysphagia [R13.12])      (Olivier's esophagus without dysplasia [K22.70])      (Colon cancer screening [Z12.11])    Surgeons: Deb Perez DO Provider: Alek Fernandes CRNA    Anesthesia Type: MAC ASA Status: 3          Anesthesia Type: MAC    Vitals  Vitals Value Taken Time   BP     Temp 97.2 °F (36.2 °C) 02/14/23 1225   Pulse     Resp     SpO2             Post Anesthesia Care and Evaluation    Patient location during evaluation: bedside  Patient participation: complete - patient participated  Level of consciousness: awake and alert  Pain score: 0  Pain management: adequate    Airway patency: patent  Anesthetic complications: No anesthetic complications  PONV Status: none  Cardiovascular status: acceptable  Respiratory status: acceptable  Hydration status: acceptable  No anesthesia care post op

## 2023-02-14 NOTE — OP NOTE
EGD Procedure Note    Pre-operative Diagnosis: Oropharyngeal dysphagia, history of Olivier's esophagus    Post-operative Diagnosis: Esophageal thrush, Olivier's esophagus, gastritis    Procedure:  EGD with biopsies with cold forceps    Surgeon: Ana    Estimated Blood Loss: Minimal    Complications: None    Anesthetic: MAC per Alek eFrnandes CRNA    Indications: See preoperative diagnosis    Findings/Treatments:    Thrush of the entire esophagus and Olivier's esophagus-multiple biopsies of the esophagus with cold forceps  Gastritis-biopsy for H. pylori with cold forceps    Recommendations:  -Await pathology.      Technique:    After discussing the benefits and risks of an EGD, benefits and risks not limited to but including:  Bleeding, infection, perforation, aspiration; informed consent was signed.  The patient was taken into the endoscopy suite at [facility] and placed in the left lateral decubitus position.  MAC anesthesia was induced under appropriate monitoring.  Bite block was placed and the gastroscope was inserted thru such and advanced under direct vision to second portion of the duodenum.  A careful inspection was made as the gastroscope was withdrawn, including a retroflexed view of the proximal stomach; findings and interventions are described below.  If biopsies were taken, this was done with the cold biopsy forceps.    Deb Perez D.O.

## 2023-02-14 NOTE — H&P
Milan RIOS Guzman 67 y.o. female presents @ the req of Dr. Starr for eval of diff swallowing solids and liquids.   Chief Complaint   Patient presents with   • Difficulty Swallowing             HPI   Above noted and agree.  This very pleasant 67-year-old female is known to my service.  I performed a laparoscopic cholecystectomy on her.  She presents today with difficulty swallowing.  She says she is not sure what will happen.  She will swallow some food and then she will take a drink and unable to swallow it.  She had a colonoscopy 10 years ago.  She says she believes it was normal.  She has had upper scopes in the past and has been diagnosed with Olivier's esophagus as well as candidiasis of the esophagus.  She does still smoke cigarettes but she has cut back.  She moves her bowels daily and does not have any abdominal pain.  She has never had a swallow study.  She has no chest pain or shortness of breath.  She has no fevers or chills.  She has no other complaints.      Review of Systems   All other systems reviewed and are negative.            Current Outpatient Medications:   •  budesonide-formoterol (SYMBICORT) 160-4.5 MCG/ACT inhaler, Inhale 2 puffs Daily., Disp: , Rfl:   •  Calcium 200 MG tablet, Take  by mouth., Disp: , Rfl:   •  diphenhydrAMINE-APAP, sleep, (TYLENOL PM EXTRA STRENGTH PO), Take  by mouth., Disp: , Rfl:   •  Fluticasone-Umeclidin-Vilant (TRELEGY ELLIPTA IN), Inhale., Disp: , Rfl:   •  lisinopril (PRINIVIL,ZESTRIL) 10 MG tablet, Take 20 mg by mouth Daily., Disp: , Rfl:   •  montelukast (SINGULAIR) 10 MG tablet, Take 10 mg by mouth Every Night., Disp: , Rfl:   •  omeprazole (priLOSEC) 20 MG capsule, Take 40 mg by mouth Daily., Disp: , Rfl:   •  oxyCODONE (OXYCONTIN) 10 MG 12 hr tablet, Take 10 mg by mouth 3 (Three) Times a Day As Needed for Severe Pain ., Disp: , Rfl:   •  pregabalin (LYRICA) 75 MG capsule, Take 75 mg by mouth 3 (Three) Times a Day., Disp: , Rfl:   •  rosuvastatin (CRESTOR) 20 MG  tablet, Take 20 mg by mouth Daily., Disp: , Rfl:         Allergies   Allergen Reactions   • No Known Drug Allergy            Past Medical History:   Diagnosis Date   • Anxiety    • Arthritis    • Back pain    • Carotid artery stenosis    • Cataract    • Chronic pain     has had epidural injections, goes to [facility]   • COPD (chronic obstructive pulmonary disease) (CMS/HCC)    • Diverticular disease    • Dysphagia     history of   • Failed back syndrome, lumbar     failed fusion   • Frequent falls     history of, none recently   • Gallstones     sched lap buck   • GERD (gastroesophageal reflux disease)    • History of transfusion    • Hypertension    • IBS (irritable bowel syndrome)     history of   • Kidney scarring     right   • Murmur    • Sciatic pain     bilateral   • Vitamin D deficiency            Past Surgical History:   Procedure Laterality Date   • BACK SURGERY      lumbar fusion   • BLADDER SURGERY      bladder lift   • BUNIONECTOMY Left 2016    Procedure: LAPIDUS BUNIONECTOMY LEFT FOOT;  Surgeon: Raphael Storm DPM;  Location: [facility] OR;  Service:    •  SECTION      x2   • CHOLECYSTECTOMY WITH INTRAOPERATIVE CHOLANGIOGRAM N/A 2019    Procedure: CHOLECYSTECTOMY LAPAROSCOPIC INTRAOPERATIVE CHOLANGIOGRAM;  Surgeon: Deb Perez DO;  Location: [facility] OR;  Service: General   • COLONOSCOPY  2013    normal per pt.   • ENDOSCOPY N/A 2017    Procedure: ESOPHAGOGASTRODUODENOSCOPY w/ biopsies;  Surgeon: Garth Morales MD;  Location: [facility] OR;  Service:    • HYSTERECTOMY     • INSERTION / REMOVAL EPIDURAL SPINAL NEUROSTIMULATOR      removed   • NECK SURGERY      C6C7 fusion & C7C8 fusion   • REPLACEMENT TOTAL KNEE Left            Social History     Tobacco Use   • Smoking status: Every Day     Packs/day: 1.00     Years: 45.00     Pack years: 45.00     Types: Cigarettes   • Smokeless tobacco: Never   • Tobacco comments:     admits to smoking since age 16 1ppd   "  Vaping Use   • Vaping Use: Never used   Substance Use Topics   • Alcohol use: No   • Drug use: No             There is no immunization history on file for this patient.        Physical Exam  Vitals and nursing note reviewed.   Constitutional:       Appearance: Normal appearance.   HENT:      Head: Normocephalic and atraumatic.   Cardiovascular:      Rate and Rhythm: Normal rate and regular rhythm.   Pulmonary:      Effort: Pulmonary effort is normal.      Breath sounds: Normal breath sounds.   Abdominal:      General: Bowel sounds are normal.      Palpations: Abdomen is soft.   Musculoskeletal:         General: No swelling or tenderness.   Skin:     General: Skin is warm and dry.   Neurological:      General: No focal deficit present.      Mental Status: She is alert and oriented to person, place, and time.   Psychiatric:         Mood and Affect: Mood normal.         Behavior: Behavior normal.         Debilities/Disabilities Identified: None    Emotional Behavior: Appropriate      /60   Pulse 73   Ht 152.4 cm (60\")   Wt 55.3 kg (122 lb)   SpO2 96%   BMI 23.83 kg/m²         Diagnoses and all orders for this visit:    1. Oropharyngeal dysphagia (Primary)    2. Olivier's esophagus without dysplasia    3. Colon cancer screening    We will get Tracie scheduled for an EGD, colonoscopy, and swallow study.  I discussed with her the benefits and risks of performing endoscopy.  Benefits and risks were not limited to but including bleeding, infection, perforation, complications of anesthesia, aspiration.  She appeared to understand and is willing to proceed.    Thank you for allowing me to participate in the care of this interesting patient.              "

## 2023-02-14 NOTE — H&P
Milan RIOS Guzman 67 y.o. female presents @ the req of Dr. Starr for eval of diff swallowing solids and liquids.   Chief Complaint   Patient presents with   • Difficulty Swallowing             HPI   Above noted and agree.  This very pleasant 67-year-old female is known to my service.  I performed a laparoscopic cholecystectomy on her.  She presents today with difficulty swallowing.  She says she is not sure what will happen.  She will swallow some food and then she will take a drink and unable to swallow it.  She had a colonoscopy 10 years ago.  She says she believes it was normal.  She has had upper scopes in the past and has been diagnosed with Olivier's esophagus as well as candidiasis of the esophagus.  She does still smoke cigarettes but she has cut back.  She moves her bowels daily and does not have any abdominal pain.  She has never had a swallow study.  She has no chest pain or shortness of breath.  She has no fevers or chills.  She has no other complaints.      Review of Systems   All other systems reviewed and are negative.            Current Outpatient Medications:   •  budesonide-formoterol (SYMBICORT) 160-4.5 MCG/ACT inhaler, Inhale 2 puffs Daily., Disp: , Rfl:   •  Calcium 200 MG tablet, Take  by mouth., Disp: , Rfl:   •  diphenhydrAMINE-APAP, sleep, (TYLENOL PM EXTRA STRENGTH PO), Take  by mouth., Disp: , Rfl:   •  Fluticasone-Umeclidin-Vilant (TRELEGY ELLIPTA IN), Inhale., Disp: , Rfl:   •  lisinopril (PRINIVIL,ZESTRIL) 10 MG tablet, Take 20 mg by mouth Daily., Disp: , Rfl:   •  montelukast (SINGULAIR) 10 MG tablet, Take 10 mg by mouth Every Night., Disp: , Rfl:   •  omeprazole (priLOSEC) 20 MG capsule, Take 40 mg by mouth Daily., Disp: , Rfl:   •  oxyCODONE (OXYCONTIN) 10 MG 12 hr tablet, Take 10 mg by mouth 3 (Three) Times a Day As Needed for Severe Pain ., Disp: , Rfl:   •  pregabalin (LYRICA) 75 MG capsule, Take 75 mg by mouth 3 (Three) Times a Day., Disp: , Rfl:   •  rosuvastatin (CRESTOR) 20 MG  tablet, Take 20 mg by mouth Daily., Disp: , Rfl:         Allergies   Allergen Reactions   • No Known Drug Allergy            Past Medical History:   Diagnosis Date   • Anxiety    • Arthritis    • Back pain    • Carotid artery stenosis    • Cataract    • Chronic pain     has had epidural injections, goes to [facility]   • COPD (chronic obstructive pulmonary disease) (CMS/HCC)    • Diverticular disease    • Dysphagia     history of   • Failed back syndrome, lumbar     failed fusion   • Frequent falls     history of, none recently   • Gallstones     sched lap buck   • GERD (gastroesophageal reflux disease)    • History of transfusion    • Hypertension    • IBS (irritable bowel syndrome)     history of   • Kidney scarring     right   • Murmur    • Sciatic pain     bilateral   • Vitamin D deficiency            Past Surgical History:   Procedure Laterality Date   • BACK SURGERY      lumbar fusion   • BLADDER SURGERY      bladder lift   • BUNIONECTOMY Left 2016    Procedure: LAPIDUS BUNIONECTOMY LEFT FOOT;  Surgeon: Raphael Storm DPM;  Location: [facility] OR;  Service:    •  SECTION      x2   • CHOLECYSTECTOMY WITH INTRAOPERATIVE CHOLANGIOGRAM N/A 2019    Procedure: CHOLECYSTECTOMY LAPAROSCOPIC INTRAOPERATIVE CHOLANGIOGRAM;  Surgeon: Deb Perez DO;  Location: [facility] OR;  Service: General   • COLONOSCOPY  2013    normal per pt.   • ENDOSCOPY N/A 2017    Procedure: ESOPHAGOGASTRODUODENOSCOPY w/ biopsies;  Surgeon: Garth Morales MD;  Location: [facility] OR;  Service:    • HYSTERECTOMY     • INSERTION / REMOVAL EPIDURAL SPINAL NEUROSTIMULATOR      removed   • NECK SURGERY      C6C7 fusion & C7C8 fusion   • REPLACEMENT TOTAL KNEE Left            Social History     Tobacco Use   • Smoking status: Every Day     Packs/day: 1.00     Years: 45.00     Pack years: 45.00     Types: Cigarettes   • Smokeless tobacco: Never   • Tobacco comments:     admits to smoking since age 16 1ppd   "  Vaping Use   • Vaping Use: Never used   Substance Use Topics   • Alcohol use: No   • Drug use: No             There is no immunization history on file for this patient.        Physical Exam  Vitals and nursing note reviewed.   Constitutional:       Appearance: Normal appearance.   HENT:      Head: Normocephalic and atraumatic.   Cardiovascular:      Rate and Rhythm: Normal rate and regular rhythm.   Pulmonary:      Effort: Pulmonary effort is normal.      Breath sounds: Normal breath sounds.   Abdominal:      General: Bowel sounds are normal.      Palpations: Abdomen is soft.   Musculoskeletal:         General: No swelling or tenderness.   Skin:     General: Skin is warm and dry.   Neurological:      General: No focal deficit present.      Mental Status: She is alert and oriented to person, place, and time.   Psychiatric:         Mood and Affect: Mood normal.         Behavior: Behavior normal.         Debilities/Disabilities Identified: None    Emotional Behavior: Appropriate      /60   Pulse 73   Ht 152.4 cm (60\")   Wt 55.3 kg (122 lb)   SpO2 96%   BMI 23.83 kg/m²         Diagnoses and all orders for this visit:    1. Oropharyngeal dysphagia (Primary)    2. Olivier's esophagus without dysplasia    3. Colon cancer screening    We will get Tracie scheduled for an EGD, colonoscopy, and swallow study.  I discussed with her the benefits and risks of performing endoscopy.  Benefits and risks were not limited to but including bleeding, infection, perforation, complications of anesthesia, aspiration.  She appeared to understand and is willing to proceed.    Thank you for allowing me to participate in the care of this interesting patient.              "

## 2023-02-14 NOTE — ANESTHESIA PREPROCEDURE EVALUATION
Anesthesia Evaluation     Patient summary reviewed and Nursing notes reviewed   history of anesthetic complications: PONV prolonged sedation  NPO Solid Status: > 8 hours  NPO Liquid Status: > 8 hours           Airway   Mallampati: I  TM distance: >3 FB  Neck ROM: full  No difficulty expected  Dental - normal exam     Pulmonary - normal exam    breath sounds clear to auscultation  (+) a smoker Current Smoked day of surgery, COPD moderate, asthma,  (-) no home oxygen  Cardiovascular - normal exam  Exercise tolerance: good (4-7 METS)    Rhythm: regular  Rate: normal    (+) hypertension well controlled less than 2 medications, valvular problems/murmurs murmur, hyperlipidemia,  carotid artery disease      Neuro/Psych  (+) TIA,    (-) headaches, numbness, psychiatric history  GI/Hepatic/Renal/Endo    (+)  GERD well controlled,  renal disease ( hx of kidney infections),     Musculoskeletal     (+) back pain, neck pain,   Abdominal    Substance History - negative use     OB/GYN negative ob/gyn ROS         Other   arthritis,                      Anesthesia Plan    ASA 3     MAC     intravenous induction     Anesthetic plan, risks, benefits, and alternatives have been provided, discussed and informed consent has been obtained with: patient.    Use of blood products discussed with patient  Consented to blood products.       CODE STATUS:

## 2023-02-15 LAB
LAB AP CASE REPORT: NORMAL
PATH REPORT.FINAL DX SPEC: NORMAL
PATH REPORT.GROSS SPEC: NORMAL
UREASE TISS QL: POSITIVE

## 2023-02-23 ENCOUNTER — TELEPHONE (OUTPATIENT)
Dept: SURGERY | Facility: CLINIC | Age: 68
End: 2023-02-23
Payer: MEDICARE

## 2023-02-23 NOTE — TELEPHONE ENCOUNTER
Rec'd phone call from [facility] @ [facility] [place] stating they have made multi attempts to reach pt to discuss speech therapy following her recent swallow study without success. Phone call to pt and she reports she is currently having some heart issues and is not interested in speech therapy.

## 2023-03-07 ENCOUNTER — TELEPHONE (OUTPATIENT)
Dept: ORTHOPEDIC SURGERY | Facility: CLINIC | Age: 68
End: 2023-03-07
Payer: MEDICARE

## 2023-03-07 NOTE — TELEPHONE ENCOUNTER
Patient calling inquiring how long a TKA should last- I did advise it was dependent on the wear and tear (use of the joint). Patient advised she had a Left TKA by Dr. Link in 2010 and she was having pain from her buttock down her leg but when she gets up and moves around the pain seems to resolve and she just has knee discomfort, however she has a heart issue and reports she couldn't do anything as far as surgery to correct anything for her knee. I did advise that it sounded more like the pain could be coming from her back as it was shooting down her leg and patient reported she does has a history of back issues. Patient was offered an appointment for her knee and she declined at this time. Did advise we are happy to see her for her knee for further evaluation and hopeful resolution of her pain.    Thanks.

## 2023-05-04 ENCOUNTER — TRANSCRIBE ORDERS (OUTPATIENT)
Dept: ADMINISTRATIVE | Facility: HOSPITAL | Age: 68
End: 2023-05-04
Payer: MEDICARE

## 2023-05-04 DIAGNOSIS — R41.82 ALTERED MENTAL STATUS, UNSPECIFIED ALTERED MENTAL STATUS TYPE: Primary | ICD-10-CM

## 2023-05-18 ENCOUNTER — HOSPITAL ENCOUNTER (OUTPATIENT)
Dept: PULMONOLOGY | Facility: HOSPITAL | Age: 68
Discharge: HOME | End: 2023-05-18
Payer: MEDICARE

## 2023-05-18 DIAGNOSIS — R41.82 ALTERED MENTAL STATUS, UNSPECIFIED ALTERED MENTAL STATUS TYPE: ICD-10-CM

## 2023-05-18 PROCEDURE — 95816 EEG AWAKE AND DROWSY: CPT

## 2023-05-18 PROCEDURE — 95816 EEG AWAKE AND DROWSY: CPT | Performed by: PSYCHIATRY & NEUROLOGY

## 2023-06-15 ENCOUNTER — HOSPITAL ENCOUNTER (OUTPATIENT)
Dept: GENERAL RADIOLOGY | Facility: HOSPITAL | Age: 68
Discharge: HOME | End: 2023-06-15
Payer: MEDICARE

## 2023-06-15 ENCOUNTER — TRANSCRIBE ORDERS (OUTPATIENT)
Dept: ADMINISTRATIVE | Facility: HOSPITAL | Age: 68
End: 2023-06-15
Payer: MEDICARE

## 2023-06-15 DIAGNOSIS — M54.2 NECK PAIN: ICD-10-CM

## 2023-06-15 DIAGNOSIS — M54.2 NECK PAIN: Primary | ICD-10-CM

## 2023-06-15 PROCEDURE — 72040 X-RAY EXAM NECK SPINE 2-3 VW: CPT

## 2023-12-21 ENCOUNTER — HOSPITAL ENCOUNTER (OUTPATIENT)
Dept: GENERAL RADIOLOGY | Facility: HOSPITAL | Age: 68
Discharge: HOME | End: 2023-12-21
Admitting: ORTHOPAEDIC SURGERY
Payer: MEDICARE

## 2023-12-21 ENCOUNTER — TRANSCRIBE ORDERS (OUTPATIENT)
Dept: ADMINISTRATIVE | Facility: HOSPITAL | Age: 68
End: 2023-12-21
Payer: MEDICARE

## 2023-12-21 DIAGNOSIS — M54.2 NECK PAIN: Primary | ICD-10-CM

## 2023-12-21 DIAGNOSIS — M54.2 NECK PAIN: ICD-10-CM

## 2023-12-21 PROCEDURE — 72040 X-RAY EXAM NECK SPINE 2-3 VW: CPT

## 2024-07-15 ENCOUNTER — OFFICE VISIT (OUTPATIENT)
Dept: SURGERY | Facility: CLINIC | Age: 69
End: 2024-07-15
Payer: MEDICARE

## 2024-07-15 VITALS
WEIGHT: 110 LBS | HEIGHT: 60 IN | RESPIRATION RATE: 16 BRPM | HEART RATE: 72 BPM | BODY MASS INDEX: 21.6 KG/M2 | DIASTOLIC BLOOD PRESSURE: 74 MMHG | SYSTOLIC BLOOD PRESSURE: 118 MMHG

## 2024-07-15 DIAGNOSIS — Z72.0 TOBACCO USE: ICD-10-CM

## 2024-07-15 DIAGNOSIS — K22.70 BARRETT'S ESOPHAGUS WITHOUT DYSPLASIA: ICD-10-CM

## 2024-07-15 DIAGNOSIS — B07.9 VIRAL WART ON FINGER: ICD-10-CM

## 2024-07-15 DIAGNOSIS — R63.4 WEIGHT LOSS: ICD-10-CM

## 2024-07-15 DIAGNOSIS — R63.0 LOSS OF APPETITE: ICD-10-CM

## 2024-07-15 DIAGNOSIS — R10.12 LEFT UPPER QUADRANT PAIN: Primary | ICD-10-CM

## 2024-07-15 PROCEDURE — 3078F DIAST BP <80 MM HG: CPT | Performed by: SURGERY

## 2024-07-15 PROCEDURE — 99214 OFFICE O/P EST MOD 30 MIN: CPT | Performed by: SURGERY

## 2024-07-15 PROCEDURE — 1160F RVW MEDS BY RX/DR IN RCRD: CPT | Performed by: SURGERY

## 2024-07-15 PROCEDURE — 3074F SYST BP LT 130 MM HG: CPT | Performed by: SURGERY

## 2024-07-15 PROCEDURE — 1159F MED LIST DOCD IN RCRD: CPT | Performed by: SURGERY

## 2024-07-15 NOTE — PROGRESS NOTES
Kentonharris Hinds 69 y.o. female presents @ the req of  for eval of pain LUQ, loss of appetite, weight loss.  Pt also had lesion removed from LEFT 2nd finger several months ago and states it has come back much bigger and she would like to discuss excision.   Chief Complaint   Patient presents with    EGD    Colonoscopy             Abdominal Pain  Associated symptoms include diarrhea and nausea. Pertinent negatives include no arthralgias, constipation, dysuria, fever, frequency, hematuria, myalgias or vomiting.      Above-noted agree.  This very sweet 69-year-old female is known to my service.  She is here today with complaints of loss of appetite and weight loss as well as left upper quadrant pain.  She has not had a colonoscopy for many years.  She cannot remember if she had polyps removed or not.  She also has a large wart on her left middle finger that was burned at 1 point but has returned with a vengeance.  She is still smoking cigarettes but she is cutting back.  She has no other complaints.Answers submitted by the patient for this visit:  Primary Reason for Visit (Submitted on 7/14/2024)  What is the primary reason for your visit?: Abdominal Pain        Review of Systems   Constitutional:  Negative for fever.   Gastrointestinal:  Positive for abdominal pain, diarrhea and nausea. Negative for constipation and vomiting.   Genitourinary:  Negative for dysuria, frequency and hematuria.   Musculoskeletal:  Negative for arthralgias and myalgias.             Current Outpatient Medications:     Calcium 200 MG tablet, Take  by mouth., Disp: , Rfl:     montelukast (SINGULAIR) 10 MG tablet, Take 10 mg by mouth Every Night., Disp: , Rfl:     omeprazole (priLOSEC) 20 MG capsule, Take 40 mg by mouth Daily., Disp: , Rfl:         Allergies   Allergen Reactions    No Known Drug Allergy            Past Medical History:   Diagnosis Date    Anxiety     Arthritis     Back pain     Carotid artery stenosis     Cataract      Chronic pain     has had epidural injections, goes to [facility]    COPD (chronic obstructive pulmonary disease)     Diverticular disease     Dysphagia     history of    Failed back syndrome, lumbar     failed fusion    Frequent falls     history of, none recently    Gallstones     sched lap buck    GERD (gastroesophageal reflux disease)     History of transfusion 1978    Hypertension     IBS (irritable bowel syndrome)     history of    Kidney scarring     right    Murmur     Sciatic pain     bilateral    Vitamin D deficiency            Past Surgical History:   Procedure Laterality Date    BACK SURGERY      lumbar fusion    BLADDER SURGERY      bladder lift    BUNIONECTOMY Left 2016    Procedure: LAPIDUS BUNIONECTOMY LEFT FOOT;  Surgeon: Raphael Storm DPM;  Location: [facility] OR;  Service:      SECTION      x2    CHOLECYSTECTOMY WITH INTRAOPERATIVE CHOLANGIOGRAM N/A 2019    Procedure: CHOLECYSTECTOMY LAPAROSCOPIC INTRAOPERATIVE CHOLANGIOGRAM;  Surgeon: Deb Perez DO;  Location: [facility] OR;  Service: General    COLONOSCOPY      normal per pt.    ENDOSCOPY N/A 2017    Procedure: ESOPHAGOGASTRODUODENOSCOPY w/ biopsies;  Surgeon: Garth Morales MD;  Location:  [facility] OR;  Service:     ENDOSCOPY N/A 2023    Procedure: Esophagogastroduodenoscopy with biopsy;  Surgeon: Deb Perez DO;  Location: [facility] OR;  Service: Gastroenterology;  Laterality: N/A;  TAMMI test  GE junction biopsy    HYSTERECTOMY      INSERTION / REMOVAL EPIDURAL SPINAL NEUROSTIMULATOR      removed    NECK SURGERY      C6C7 fusion & C7C8 fusion    REPLACEMENT TOTAL KNEE Left            Social History     Tobacco Use    Smoking status: Every Day     Current packs/day: 1.00     Average packs/day: 1 pack/day for 45.0 years (45.0 ttl pk-yrs)     Types: Cigarettes    Smokeless tobacco: Never    Tobacco comments:     admits to smoking since age 16 1ppd . PT SMOKED THIS MORNING   Vaping Use     "Vaping status: Never Used   Substance Use Topics    Alcohol use: No    Drug use: No           Immunization History   Administered Date(s) Administered    COVID-19 (MODERNA) 1st,2nd,3rd Dose Monovalent 03/18/2021, 04/15/2021    COVID-19 (MODERNA) Monovalent Original Booster 12/01/2021           Physical Exam  Vitals and nursing note reviewed.   Constitutional:       Appearance: Normal appearance.   Cardiovascular:      Rate and Rhythm: Normal rate and regular rhythm.   Pulmonary:      Effort: Pulmonary effort is normal.      Breath sounds: Normal breath sounds.   Abdominal:      General: Bowel sounds are normal.      Palpations: Abdomen is soft.   Musculoskeletal:      Comments: Severe kyphosis   Neurological:      General: No focal deficit present.      Mental Status: She is alert and oriented to person, place, and time.   Psychiatric:         Mood and Affect: Mood normal.         Behavior: Behavior normal.         Debilities/Disabilities Identified: None    Emotional Behavior: Appropriate      /74   Pulse 72   Resp 16   Ht 152.4 cm (60\")   Wt 49.9 kg (110 lb)   BMI 21.48 kg/m²         Diagnoses and all orders for this visit:    1. Left upper quadrant pain (Primary)    2. Olivier's esophagus without dysplasia    3. Weight loss    4. Loss of appetite    5. Viral wart on finger    We will get Tracie scheduled for an EGD, colonoscopy, and excision of wart.  We discussed the benefits and risks and rationale and all of her questions were answered and she is willing to proceed.    Thank you for allowing me to participate in the care of this interesting patient.         "

## 2024-07-15 NOTE — LETTER
July 15, 2024       No Recipients    Patient: Milan Hinds   YOB: 1955   Date of Visit: 7/15/2024     Dear Venkatesh Starr MD:       Thank you for referring Milan Hinds to me for evaluation. Below are the relevant portions of my assessment and plan of care.    If you have questions, please do not hesitate to call me. I look forward to following Milan along with you.         Sincerely,        Deb Perez DO        CC:   No Recipients    Deb Perez DO  07/15/24 1020  Sign when Signing Visit  Milan Hinds 69 y.o. female presents @ the req of  for eval of pain LUQ, loss of appetite, weight loss.  Pt also had lesion removed from LEFT 2nd finger several months ago and states it has come back much bigger and she would like to discuss excision.   Chief Complaint   Patient presents with   • EGD   • Colonoscopy             Abdominal Pain  Associated symptoms include diarrhea and nausea. Pertinent negatives include no arthralgias, constipation, dysuria, fever, frequency, hematuria, myalgias or vomiting.      Above-noted agree.  This very sweet 69-year-old female is known to my service.  She is here today with complaints of loss of appetite and weight loss as well as left upper quadrant pain.  She has not had a colonoscopy for many years.  She cannot remember if she had polyps removed or not.  She also has a large wart on her left middle finger that was burned at 1 point but has returned with a vengeance.  She is still smoking cigarettes but she is cutting back.  She has no other complaints.Answers submitted by the patient for this visit:  Primary Reason for Visit (Submitted on 7/14/2024)  What is the primary reason for your visit?: Abdominal Pain        Review of Systems   Constitutional:  Negative for fever.   Gastrointestinal:  Positive for abdominal pain, diarrhea and nausea. Negative for constipation and vomiting.   Genitourinary:  Negative for dysuria, frequency and hematuria.    Musculoskeletal:  Negative for arthralgias and myalgias.             Current Outpatient Medications:   •  Calcium 200 MG tablet, Take  by mouth., Disp: , Rfl:   •  montelukast (SINGULAIR) 10 MG tablet, Take 10 mg by mouth Every Night., Disp: , Rfl:   •  omeprazole (priLOSEC) 20 MG capsule, Take 40 mg by mouth Daily., Disp: , Rfl:         Allergies   Allergen Reactions   • No Known Drug Allergy            Past Medical History:   Diagnosis Date   • Anxiety    • Arthritis    • Back pain    • Carotid artery stenosis    • Cataract    • Chronic pain     has had epidural injections, goes to [facility]   • COPD (chronic obstructive pulmonary disease)    • Diverticular disease    • Dysphagia     history of   • Failed back syndrome, lumbar     failed fusion   • Frequent falls     history of, none recently   • Gallstones     sched lap ubck   • GERD (gastroesophageal reflux disease)    • History of transfusion    • Hypertension    • IBS (irritable bowel syndrome)     history of   • Kidney scarring     right   • Murmur    • Sciatic pain     bilateral   • Vitamin D deficiency            Past Surgical History:   Procedure Laterality Date   • BACK SURGERY      lumbar fusion   • BLADDER SURGERY      bladder lift   • BUNIONECTOMY Left 2016    Procedure: LAPIDUS BUNIONECTOMY LEFT FOOT;  Surgeon: Raphael Storm DPM;  Location: [facility] OR;  Service:    •  SECTION      x2   • CHOLECYSTECTOMY WITH INTRAOPERATIVE CHOLANGIOGRAM N/A 2019    Procedure: CHOLECYSTECTOMY LAPAROSCOPIC INTRAOPERATIVE CHOLANGIOGRAM;  Surgeon: Deb Perez DO;  Location: [facility] OR;  Service: General   • COLONOSCOPY  2013    normal per pt.   • ENDOSCOPY N/A 2017    Procedure: ESOPHAGOGASTRODUODENOSCOPY w/ biopsies;  Surgeon: Garth Morales MD;  Location: [facility] OR;  Service:    • ENDOSCOPY N/A 2023    Procedure: Esophagogastroduodenoscopy with biopsy;  Surgeon: Deb Perez DO;  Location: [facility] OR;   "Service: Gastroenterology;  Laterality: N/A;  TAMMI test  GE junction biopsy   • HYSTERECTOMY     • INSERTION / REMOVAL EPIDURAL SPINAL NEUROSTIMULATOR      removed   • NECK SURGERY      C6C7 fusion & C7C8 fusion   • REPLACEMENT TOTAL KNEE Left            Social History     Tobacco Use   • Smoking status: Every Day     Current packs/day: 1.00     Average packs/day: 1 pack/day for 45.0 years (45.0 ttl pk-yrs)     Types: Cigarettes   • Smokeless tobacco: Never   • Tobacco comments:     admits to smoking since age 16 1ppd . PT SMOKED THIS MORNING   Vaping Use   • Vaping status: Never Used   Substance Use Topics   • Alcohol use: No   • Drug use: No           Immunization History   Administered Date(s) Administered   • COVID-19 (MODERNA) 1st,2nd,3rd Dose Monovalent 03/18/2021, 04/15/2021   • COVID-19 (MODERNA) Monovalent Original Booster 12/01/2021           Physical Exam  Vitals and nursing note reviewed.   Constitutional:       Appearance: Normal appearance.   Cardiovascular:      Rate and Rhythm: Normal rate and regular rhythm.   Pulmonary:      Effort: Pulmonary effort is normal.      Breath sounds: Normal breath sounds.   Abdominal:      General: Bowel sounds are normal.      Palpations: Abdomen is soft.   Musculoskeletal:      Comments: Severe kyphosis   Neurological:      General: No focal deficit present.      Mental Status: She is alert and oriented to person, place, and time.   Psychiatric:         Mood and Affect: Mood normal.         Behavior: Behavior normal.         Debilities/Disabilities Identified: None    Emotional Behavior: Appropriate      /74   Pulse 72   Resp 16   Ht 152.4 cm (60\")   Wt 49.9 kg (110 lb)   BMI 21.48 kg/m²         Diagnoses and all orders for this visit:    1. Left upper quadrant pain (Primary)    2. Olivier's esophagus without dysplasia    3. Weight loss    4. Loss of appetite    5. Viral wart on finger    We will get Tracie scheduled for an EGD, colonoscopy, and excision of " wart.  We discussed the benefits and risks and rationale and all of her questions were answered and she is willing to proceed.    Thank you for allowing me to participate in the care of this interesting patient.

## 2024-07-16 PROBLEM — R10.12 LEFT UPPER QUADRANT PAIN: Status: ACTIVE | Noted: 2024-07-15

## 2024-07-16 PROBLEM — R63.0 LOSS OF APPETITE: Status: ACTIVE | Noted: 2024-07-15

## 2024-07-16 PROBLEM — B07.9 VIRAL WART ON FINGER: Status: ACTIVE | Noted: 2024-07-15

## 2024-07-16 PROBLEM — R63.4 WEIGHT LOSS: Status: ACTIVE | Noted: 2024-07-15

## (undated) DEVICE — Device

## (undated) DEVICE — DRSNG SURESITE WNDW 4X4.5

## (undated) DEVICE — THE BITE BLOCK MAXI, LATEX FREE STRAP IS USED TO PROTECT THE ENDOSCOPE INSERTION TUBE FROM BEING BITTEN BY THE PATIENT.

## (undated) DEVICE — FRCP BX RADJAW4 NDL 2.8 240CM LG OG BX40

## (undated) DEVICE — MASK,FACE,SHIELD,BLUE,ANTI FOG,TIES: Brand: MEDLINE

## (undated) DEVICE — DRSNG SURESITE WNDW 2.38X2.75

## (undated) DEVICE — PK LAP GEN 90

## (undated) DEVICE — SYR LL 3CC

## (undated) DEVICE — ENDOPATH XCEL BLADELESS TROCARS WITH STABILITY SLEEVES: Brand: ENDOPATH XCEL

## (undated) DEVICE — GOWN ISOL W/THUMB UNIV BLU BX/15

## (undated) DEVICE — SPNG GZ 2S 2X2 8PLY STRL PK/2

## (undated) DEVICE — APPL CHLORAPREP W/TINT 26ML ORNG

## (undated) DEVICE — TROCARS: Brand: KII® BALLOON BLUNT TIP SYSTEM

## (undated) DEVICE — ENDOCUT SCISSOR TIP, DISPOSABLE: Brand: RENEW

## (undated) DEVICE — SAFELINER SUCTION CANISTER 1000CC: Brand: DEROYAL

## (undated) DEVICE — ENDOPATH XCEL UNIVERSAL TROCAR STABLILITY SLEEVES: Brand: ENDOPATH XCEL

## (undated) DEVICE — BW-412T DISP COMBO CLEANING BRUSH: Brand: SINGLE USE COMBINATION CLEANING BRUSH

## (undated) DEVICE — ENDOPOUCH RETRIEVER SPECIMEN RETRIEVAL BAGS: Brand: ENDOPOUCH RETRIEVER

## (undated) DEVICE — GLV SURG SENSICARE W/ALOE PF LF 6.5 STRL

## (undated) DEVICE — SYR LUERLOK 30CC

## (undated) DEVICE — SUCTION CANISTER, 1000CC,SAFELINER: Brand: DEROYAL

## (undated) DEVICE — GLV SURG NEOLON 2G PF LF 7.5 STRL

## (undated) DEVICE — CONTAINER,SPECIMEN,OR STERILE,4OZ: Brand: MEDLINE

## (undated) DEVICE — SUT VIC 0/0 UR6 27IN DYED J603H

## (undated) DEVICE — LAB CORP AGAR SLANT UREA PK/10

## (undated) DEVICE — TBG INSUFL W FLTR STRL

## (undated) DEVICE — VIAL FORMALIN CAP 10P 40ML

## (undated) DEVICE — Device: Brand: DEFENDO AIR/WATER/SUCTION AND BIOPSY VALVE

## (undated) DEVICE — ADAPT CLN BIOGUARD AIR/H2O DISP

## (undated) DEVICE — SUCTION CANISTER, 3000CC,SAFELINER: Brand: DEROYAL

## (undated) DEVICE — JACKT LAB F/R KNIT CUFF/COLR XLG BLU

## (undated) DEVICE — SOL IRR H2O BTL 1000ML STRL

## (undated) DEVICE — DRP C/ARM 41X74IN

## (undated) DEVICE — 3M™ STERI-STRIP™ REINFORCED ADHESIVE SKIN CLOSURES, R1547, 1/2 IN X 4 IN (12 MM X 100 MM), 6 STRIPS/ENVELOPE: Brand: 3M™ STERI-STRIP™

## (undated) DEVICE — SYR LUERLOK 20CC

## (undated) DEVICE — UNDYED BRAIDED (POLYGLACTIN 910), SYNTHETIC ABSORBABLE SUTURE: Brand: COATED VICRYL

## (undated) DEVICE — SPNG GZ WOVN 4X4IN 12PLY 10/BX STRL

## (undated) DEVICE — 2, DISPOSABLE SUCTION/IRRIGATOR WITH DISPOSABLE TIP: Brand: STRYKEFLOW

## (undated) DEVICE — DECANT BG O JET

## (undated) DEVICE — SET CATH CHOLANG REDK W/SCOOP TIP INTRO 4F 50CM

## (undated) DEVICE — KT ORCA ORCAPOD DISP STRL